# Patient Record
Sex: MALE | Race: WHITE | NOT HISPANIC OR LATINO | Employment: FULL TIME | URBAN - METROPOLITAN AREA
[De-identification: names, ages, dates, MRNs, and addresses within clinical notes are randomized per-mention and may not be internally consistent; named-entity substitution may affect disease eponyms.]

---

## 2017-02-11 ENCOUNTER — OFFICE VISIT (OUTPATIENT)
Dept: URGENT CARE | Facility: URGENT CARE | Age: 47
End: 2017-02-11
Payer: COMMERCIAL

## 2017-02-11 ENCOUNTER — RADIANT APPOINTMENT (OUTPATIENT)
Dept: GENERAL RADIOLOGY | Facility: CLINIC | Age: 47
End: 2017-02-11
Attending: FAMILY MEDICINE
Payer: COMMERCIAL

## 2017-02-11 VITALS
SYSTOLIC BLOOD PRESSURE: 107 MMHG | OXYGEN SATURATION: 98 % | TEMPERATURE: 96.5 F | DIASTOLIC BLOOD PRESSURE: 66 MMHG | HEART RATE: 82 BPM | WEIGHT: 182.4 LBS | BODY MASS INDEX: 24.18 KG/M2 | HEIGHT: 73 IN

## 2017-02-11 DIAGNOSIS — M79.641 PAIN OF RIGHT HAND: Primary | ICD-10-CM

## 2017-02-11 DIAGNOSIS — S69.90XA HAND INJURY: ICD-10-CM

## 2017-02-11 PROCEDURE — 73130 X-RAY EXAM OF HAND: CPT | Mod: RT

## 2017-02-11 PROCEDURE — 99213 OFFICE O/P EST LOW 20 MIN: CPT | Performed by: FAMILY MEDICINE

## 2017-02-11 NOTE — PROGRESS NOTES
"SUBJECTIVE:  Sascha Joshi is a 46 year old male who presents for pain in the right thumb:  Location: base of the right thumb.  Symptoms started yesterday on the palmar side of the base of the thumb after putting together some [employer] furniture.  Today, discomfort all around the base of the thumb.  No recalled injury.   No swelling or erythema.   He has taken no analgesics and tried no symptomatic cares as of yet.   No c/o numbness.  H/o fracture to the base of the thumb 2 years ago, has been to ortho for this.   Concerned for new fracture the same area today.    Left hand dominant.    ROS:  5-Point Review of Systems Negative-- Except as stated above.    EXAM:   /66 mmHg  Pulse 82  Temp(Src) 96.5  F (35.8  C) (Tympanic)  Ht 6' 1\" (1.854 m)  Wt 182 lb 6.4 oz (82.736 kg)  BMI 24.07 kg/m2  SpO2 98%  General: healthy, alert and no distress  Involved area: right thumb  Description of injury site {EXAM INJURY: no swelling, bruising, erythema noted.  No discomfort with palpation.  Skin: Intact at site  Neurovascular status: There is not compromise to the distal circulation.    Range of motion of the affected area: Full Range of Motion of the thumb/fingers, hand and wrist    X-RAY was done.  No acute fractures noted.  No dislocation noted.   Official read by radiology pending.  The clinic will call patient if result different than discussed during visit today.      ASSESSMENT/PLAN:     ICD-10-CM    1. Pain of right hand M79.641 XR Hand Right G/E 3 Views     Discussed suspected overuse injury to previously broken joint - conservative cares to start.  Anticipate improvement at 1 week, resolved by 2 weeks.  If not, further evaluation may be necessary.      Patient Instructions   Ice, aleve or ibuprofen for the next several days (take with food), avoid aggravating activity.   Recheck in 2 weeks with ortho or your primary provider if all symptoms not resolved.                "

## 2017-02-11 NOTE — MR AVS SNAPSHOT
After Visit Summary   2/11/2017    Sascha Joshi    MRN: [id number]           Patient Information     Date Of Birth          1970        Visit Information        Provider Department      2/11/2017 8:30 AM Kenisha Cosme DO [facility]        Today's Diagnoses     Hand injury    -  1       Care Instructions    Ice, aleve or ibuprofen for the next several days (take with food), avoid aggravating activity.   Recheck in 2 weeks with ortho or your primary provider if all symptoms not resolved.          Follow-ups after your visit        Who to contact     If you have questions or need follow up information about today's clinic visit or your schedule please contact [facility] directly at [phone].  Normal or non-critical lab and imaging results will be communicated to you by eReplicanthart, letter or phone within 4 business days after the clinic has received the results. If you do not hear from us within 7 days, please contact the clinic through [employer] or phone. If you have a critical or abnormal lab result, we will notify you by phone as soon as possible.  Submit refill requests through FSLogix or call your pharmacy and they will forward the refill request to us. Please allow 3 business days for your refill to be completed.          Additional Information About Your Visit        MyChart Information     FSLogix gives you secure access to your electronic health record. If you see a primary care provider, you can also send messages to your care team and make appointments. If you have questions, please call your primary care clinic.  If you do not have a primary care provider, please call [phone] and they will assist you.        Care EveryWhere ID     This is your Care EveryWhere ID. This could be used by other organizations to access your [facility] medical records  [id number]        Your Vitals Were     Pulse Temperature Height BMI (Body Mass  "Index) Pulse Oximetry       82 96.5  F (35.8  C) (Tympanic) 6' 1\" (1.854 m) 24.07 kg/m2 98%        Blood Pressure from Last 3 Encounters:   02/11/17 107/66   08/01/16 116/68   07/18/16 105/70    Weight from Last 3 Encounters:   02/11/17 182 lb 6.4 oz (82.736 kg)   08/01/16 181 lb (82.101 kg)   07/18/16 179 lb (81.194 kg)               Primary Care Provider Office Phone # Fax #    Fer Galvan [phone]       [address] [address]        Thank you!     Thank you for choosing [facility]  for your care. Our goal is always to provide you with excellent care. Hearing back from our patients is one way we can continue to improve our services. Please take a few minutes to complete the written survey that you may receive in the mail after your visit with us. Thank you!             Your Updated Medication List - Protect others around you: Learn how to safely use, store and throw away your medicines at www.disposemymeds.org.          This list is accurate as of: 2/11/17  8:51 AM.  Always use your most recent med list.                   Brand Name Dispense Instructions for use    IBUPROFEN PO            "

## 2017-02-11 NOTE — PATIENT INSTRUCTIONS
Ice, aleve or ibuprofen for the next several days (take with food), avoid aggravating activity.   Recheck in 2 weeks with ortho or your primary provider if all symptoms not resolved.

## 2017-02-11 NOTE — NURSING NOTE
"Chief Complaint   Patient presents with     Urgent Care     Pt in clinic to have eval for right hand pain due to possible injury.     Hand Injury       Initial /66 mmHg  Pulse 82  Temp(Src) 96.5  F (35.8  C) (Tympanic)  Ht 6' 1\" (1.854 m)  Wt 182 lb 6.4 oz (82.736 kg)  BMI 24.07 kg/m2  SpO2 98% Estimated body mass index is 24.07 kg/(m^2) as calculated from the following:    Height as of this encounter: 6' 1\" (1.854 m).    Weight as of this encounter: 182 lb 6.4 oz (82.736 kg).  Medication Reconciliation: complete   Makayla Angel/ MA    "

## 2018-03-17 ENCOUNTER — RADIANT APPOINTMENT (OUTPATIENT)
Dept: GENERAL RADIOLOGY | Facility: CLINIC | Age: 48
End: 2018-03-17
Attending: FAMILY MEDICINE
Payer: COMMERCIAL

## 2018-03-17 ENCOUNTER — OFFICE VISIT (OUTPATIENT)
Dept: URGENT CARE | Facility: URGENT CARE | Age: 48
End: 2018-03-17
Payer: COMMERCIAL

## 2018-03-17 VITALS
BODY MASS INDEX: 24.26 KG/M2 | WEIGHT: 189 LBS | HEIGHT: 74 IN | DIASTOLIC BLOOD PRESSURE: 76 MMHG | OXYGEN SATURATION: 99 % | TEMPERATURE: 98.4 F | HEART RATE: 81 BPM | SYSTOLIC BLOOD PRESSURE: 130 MMHG

## 2018-03-17 DIAGNOSIS — J98.8 RESPIRATORY INFECTION: ICD-10-CM

## 2018-03-17 DIAGNOSIS — R07.89 CHEST DISCOMFORT: ICD-10-CM

## 2018-03-17 DIAGNOSIS — J01.90 ACUTE SINUSITIS WITH SYMPTOMS GREATER THAN 10 DAYS: Primary | ICD-10-CM

## 2018-03-17 PROCEDURE — 99214 OFFICE O/P EST MOD 30 MIN: CPT | Performed by: FAMILY MEDICINE

## 2018-03-17 PROCEDURE — 71046 X-RAY EXAM CHEST 2 VIEWS: CPT

## 2018-03-17 RX ORDER — AZITHROMYCIN 250 MG/1
TABLET, FILM COATED ORAL
Qty: 6 TABLET | Refills: 0 | Status: SHIPPED | OUTPATIENT
Start: 2018-03-17 | End: 2018-03-23

## 2018-03-17 RX ORDER — FLUTICASONE PROPIONATE 50 MCG
1-2 SPRAY, SUSPENSION (ML) NASAL DAILY
Qty: 1 BOTTLE | Refills: 0 | Status: SHIPPED | OUTPATIENT
Start: 2018-03-17 | End: 2019-10-24

## 2018-03-17 NOTE — MR AVS SNAPSHOT
After Visit Summary   3/17/2018    Sascha Joshi    MRN: [id number]           Patient Information     Date Of Birth          1970        Visit Information        Provider Department      3/17/2018 7:10 PM Kenisha Cosme DO [facility]        Today's Diagnoses     Acute sinusitis with symptoms greater than 10 days    -  1    Respiratory infection        Chest discomfort          Care Instructions    Start the antibiotic tonight.   Consider nasal saline mist for comfort with the nasal congestion.  Start the nasal steroid spray tonight as well.      Aleve for discomfort every 12 hours (take with food) for the next 2-3 days, then if/as needed.    If symptoms not resolving over the next week, or if any worsening symptoms develop, recheck with your primary provider.          Follow-ups after your visit        Who to contact     If you have questions or need follow up information about today's clinic visit or your schedule please contact [facility] directly at [phone].  Normal or non-critical lab and imaging results will be communicated to you by Shelfiehart, letter or phone within 4 business days after the clinic has received the results. If you do not hear from us within 7 days, please contact the clinic through Glancet or phone. If you have a critical or abnormal lab result, we will notify you by phone as soon as possible.  Submit refill requests through Vint or call your pharmacy and they will forward the refill request to us. Please allow 3 business days for your refill to be completed.          Additional Information About Your Visit        MyChart Information     Vint gives you secure access to your electronic health record. If you see a primary care provider, you can also send messages to your care team and make appointments. If you have questions, please call your primary care clinic.  If you do not have a primary care provider,  "please call [phone] and they will assist you.        Care EveryWhere ID     This is your Care EveryWhere ID. This could be used by other organizations to access your [facility] medical records  [id number]        Your Vitals Were     Pulse Temperature Height Pulse Oximetry BMI (Body Mass Index)       81 98.4  F (36.9  C) (Tympanic) 6' 2\" (1.88 m) 99% 24.27 kg/m2        Blood Pressure from Last 3 Encounters:   03/17/18 130/76   02/11/17 107/66   08/01/16 116/68    Weight from Last 3 Encounters:   03/17/18 189 lb (85.7 kg)   02/11/17 182 lb 6.4 oz (82.7 kg)   08/01/16 181 lb (82.1 kg)                 Today's Medication Changes          These changes are accurate as of 3/17/18  8:24 PM.  If you have any questions, ask your nurse or doctor.               Start taking these medicines.        Dose/Directions    azithromycin 250 MG tablet   Commonly known as:  ZITHROMAX   Used for:  Acute sinusitis with symptoms greater than 10 days   Started by:  Kenisha Cosme,         Two tablets first day, then one tablet daily for four days.   Quantity:  6 tablet   Refills:  0       fluticasone 50 MCG/ACT spray   Commonly known as:  FLONASE   Used for:  Acute sinusitis with symptoms greater than 10 days   Started by:  Kenisha Cosme,         Dose:  1-2 spray   Spray 1-2 sprays into both nostrils daily   Quantity:  1 Bottle   Refills:  0            Where to get your medicines      These medications were sent to [facility]/pharmacy #[id number] - [place], [place] - [address] AT [facility]  [address] [place] [address]    Hours:  24-hours Phone:  [phone]     azithromycin 250 MG tablet    fluticasone 50 MCG/ACT spray                Primary Care Provider Office Phone # Fax #    Fer Galvan [phone] [address] [facility] [address]        Equal Access to Services     HOA STOCK AH: Rogers Monterroso, dain luafsaneh, qaybta kaalmaml guerrain " giselaandrewtessie seniortaisha rohanadele larenetessie ah. So [facility] [phone].    ATENCIÓN: Si mariaelenala david, tiene a mcintyre disposición servicios gratuitos de asistencia lingüística. Aleksandra al [phone].    We comply with applicable federal civil rights laws and [place] laws. We do not discriminate on the basis of race, color, national origin, age, disability, sex, sexual orientation, or gender identity.            Thank you!     Thank you for choosing [facility]RG[facility]  for your care. Our goal is always to provide you with excellent care. Hearing back from our patients is one way we can continue to improve our services. Please take a few minutes to complete the written survey that you may receive in the mail after your visit with us. Thank you!             Your Updated Medication List - Protect others around you: Learn how to safely use, store and throw away your medicines at www.disposemymeds.org.          This list is accurate as of 3/17/18  8:24 PM.  Always use your most recent med list.                   Brand Name Dispense Instructions for use Diagnosis    azithromycin 250 MG tablet    ZITHROMAX    6 tablet    Two tablets first day, then one tablet daily for four days.    Acute sinusitis with symptoms greater than 10 days       fluticasone 50 MCG/ACT spray    FLONASE    1 Bottle    Spray 1-2 sprays into both nostrils daily    Acute sinusitis with symptoms greater than 10 days       IBUPROFEN PO       Injury of right hand, initial encounter

## 2018-03-18 NOTE — NURSING NOTE
"Chief Complaint   Patient presents with     Urgent Care     Pt in clinic to have eval for head congestion, chest discomfort. and nasal congestion.     Respiratory Problems       Initial /76  Pulse 81  Temp 98.4  F (36.9  C) (Tympanic)  Ht 6' 2\" (1.88 m)  Wt 189 lb (85.7 kg)  SpO2 99%  BMI 24.27 kg/m2 Estimated body mass index is 24.27 kg/(m^2) as calculated from the following:    Height as of this encounter: 6' 2\" (1.88 m).    Weight as of this encounter: 189 lb (85.7 kg).  Medication Reconciliation: complete   Makayla Angel/ MA    "

## 2018-03-18 NOTE — PROGRESS NOTES
"SUBJECTIVE:   Sascha Joshi is a 47 year old male presenting with a chief complaint of Upper Respiratory/ENT symptoms:  Symptoms started 2 weeks ago with nasal sinus congestion without cough.  Runny initially, now stuffy and congested.  Occasional blood streaking on tissue with blowing or clearing out nose.  No coughing or sneezing.   Left ear pressure has developed.   Over the past day or so he has noticed intermittent chest discomfort that was on the left axillary area at one point, then went to the right axillary region. Today its noticed on the right side, near the axilla.  Comes and goes.  A little more noticeable with deep breath.   No fevers during the illness.  Not feeling any shortness of breath.   Did have the sensation of noticing his heart beat over a week ago, not too hard or too fast, just noticing the beat when normally he doesn't.  No alcohol, a little caffeine, no h/o asthma.  Non smoker.     ROS:  5-Point Review of Systems Negative-- Except as stated above.    OBJECTIVE  /76  Pulse 81  Temp 98.4  F (36.9  C) (Tympanic)  Ht 6' 2\" (1.88 m)  Wt 189 lb (85.7 kg)  SpO2 99%  BMI 24.27 kg/m2  GENERAL:  Awake, alert and interactive. No acute distress.  HEENT:   NC/AT, EOMI, clear conjunctiva.  Nose congested.  Oropharynx moist and clear.  TM's dull with effusions BL, and EAC's benign.  NECK: supple and free of adenopathy  CHEST:  Lungs are clear, no rhonchi, wheezing or rales. Normal symmetric air entry throughout both lung fields.    HEART:  S1 and S2 normal, no murmurs, clicks, gallops or rubs. Regular rate and rhythm.    CXR - no infiltrate or effusion.  Official read by radiology pending.  The clinic will call patient if result different than discussed during visit today.      ASSESSMENT/PLAN    ICD-10-CM    1. Acute sinusitis with symptoms greater than 10 days J01.90 fluticasone (FLONASE) 50 MCG/ACT spray     azithromycin (ZITHROMAX) 250 MG tablet   2. Respiratory infection J98.8 XR " Chest 2 Views   3. Chest discomfort R07.89 XR Chest 2 Views   nsaids for the pleuritic discomfort.  We discussed the expected course and symptomatic cares in detail, including return to care if symptoms not improving as expected, do not resolve completely, or if any new or worsening symptoms develop.  Patient Instructions   Start the antibiotic tonight.   Consider nasal saline mist for comfort with the nasal congestion.  Start the nasal steroid spray tonight as well.      Aleve for discomfort every 12 hours (take with food) for the next 2-3 days, then if/as needed.    If symptoms not resolving over the next week, or if any worsening symptoms develop, recheck with your primary provider.

## 2018-03-18 NOTE — PATIENT INSTRUCTIONS
Start the antibiotic tonight.   Consider nasal saline mist for comfort with the nasal congestion.  Start the nasal steroid spray tonight as well.      Aleve for discomfort every 12 hours (take with food) for the next 2-3 days, then if/as needed.    If symptoms not resolving over the next week, or if any worsening symptoms develop, recheck with your primary provider.

## 2018-03-23 ENCOUNTER — OFFICE VISIT (OUTPATIENT)
Dept: FAMILY MEDICINE | Facility: CLINIC | Age: 48
End: 2018-03-23
Payer: COMMERCIAL

## 2018-03-23 VITALS
OXYGEN SATURATION: 100 % | HEART RATE: 75 BPM | BODY MASS INDEX: 24.45 KG/M2 | SYSTOLIC BLOOD PRESSURE: 124 MMHG | TEMPERATURE: 97.2 F | RESPIRATION RATE: 16 BRPM | WEIGHT: 190.4 LBS | DIASTOLIC BLOOD PRESSURE: 75 MMHG

## 2018-03-23 DIAGNOSIS — R07.89 SENSATION OF CHEST PRESSURE: Primary | ICD-10-CM

## 2018-03-23 PROCEDURE — 99213 OFFICE O/P EST LOW 20 MIN: CPT | Performed by: NURSE PRACTITIONER

## 2018-03-23 NOTE — PROGRESS NOTES
SUBJECTIVE:   Sascha Joshi is a 47 year old male who presents to clinic today for the following health issues:    ED/UC Followup:    Facility:  [facility]  Date of visit: 03/17/2018  Reason for visit: Acute sinusitis  Current Status: Doesn't feel much better- still having some chest discomfort, more pressure in his neck area.      Sinus symptoms have resolved.  Still having chest pressure and feels like a pressure in his neck.    Was told it was pleuritic chest pressure.    No longer coughing or congested.   No reflux or GERD  No fevers.    Eating normally.    Denies chest pain, KATZ, SOB, dizziness or lightheadedness.  No pain radiating to left arm or jaw.  No reflux.      Problem list and histories reviewed & adjusted, as indicated.  Additional history: as documented    Reviewed and updated as needed this visit by clinical staff  Tobacco  Allergies  Meds  Med Hx  Surg Hx  Fam Hx  Soc Hx      Reviewed and updated as needed this visit by Provider  Tobacco  Med Hx  Surg Hx  Fam Hx  Soc Hx        ROS:  Constitutional, HEENT, cardiovascular, pulmonary, gi and gu systems are negative, except as otherwise noted.    OBJECTIVE:     /75 (BP Location: Right arm, Patient Position: Chair, Cuff Size: Adult Regular)  Pulse 75  Temp 97.2  F (36.2  C) (Tympanic)  Resp 16  Wt 190 lb 6.4 oz (86.4 kg)  SpO2 100%  BMI 24.45 kg/m2  Body mass index is 24.45 kg/(m^2).  GENERAL: healthy, alert and no distress  EYES: Eyes grossly normal to inspection, PERRL and conjunctivae and sclerae normal  HENT: ear canals and TM's normal, nose and mouth without ulcers or lesions  NECK: no adenopathy, no asymmetry, masses, or scars and thyroid normal to palpation  RESP: lungs clear to auscultation - no rales, rhonchi or wheezes  CV: regular rate and rhythm, normal S1 S2, no S3 or S4, no murmur, click or rub, no peripheral edema and peripheral pulses strong  ABDOMEN: soft, nontender, no hepatosplenomegaly, no  masses and bowel sounds normal  MS: no gross musculoskeletal defects noted, no edema  SKIN: no suspicious lesions or rashes  PSYCH: mentation appears normal, affect normal/bright      ASSESSMENT/PLAN:       ICD-10-CM    1. Sensation of chest pressure R07.89    discussed normal exam and VSS.  Discussed if it was pleurisy can take some time to resolve.    Sinus symptoms have since resolved.  Will continue to monitor pressure and f/u if persists or worsens.      See Patient Instructions    SOURAV Clifton CNP  [facility]

## 2018-03-23 NOTE — MR AVS SNAPSHOT
After Visit Summary   3/23/2018    Sascha Joshi    MRN: [id number]           Patient Information     Date Of Birth          1970        Visit Information        Provider Department      3/23/2018 11:00 AM Kyra Whitfield APRN CNP [facility]        Today's Diagnoses     Sensation of chest pressure    -  1       Follow-ups after your visit        Who to contact     If you have questions or need follow up information about today's clinic visit or your schedule please contact [facility] directly at [phone].  Normal or non-critical lab and imaging results will be communicated to you by 360Guanxihart, letter or phone within 4 business days after the clinic has received the results. If you do not hear from us within 7 days, please contact the clinic through Datezrt or phone. If you have a critical or abnormal lab result, we will notify you by phone as soon as possible.  Submit refill requests through CelluComp or call your pharmacy and they will forward the refill request to us. Please allow 3 business days for your refill to be completed.          Additional Information About Your Visit        MyChart Information     CelluComp gives you secure access to your electronic health record. If you see a primary care provider, you can also send messages to your care team and make appointments. If you have questions, please call your primary care clinic.  If you do not have a primary care provider, please call [phone] and they will assist you.        Care EveryWhere ID     This is your Care EveryWhere ID. This could be used by other organizations to access your [facility] medical records  [id number]        Your Vitals Were     Pulse Temperature Respirations Pulse Oximetry BMI (Body Mass Index)       75 97.2  F (36.2  C) (Tympanic) 16 100% 24.45 kg/m2        Blood Pressure from Last 3 Encounters:   03/23/18 124/75   03/17/18 130/76   02/11/17 107/66    Weight from  Last 3 Encounters:   03/23/18 190 lb 6.4 oz (86.4 kg)   03/17/18 189 lb (85.7 kg)   02/11/17 182 lb 6.4 oz (82.7 kg)              Today, you had the following     No orders found for display       Primary Care Provider Office Phone # Fax #    Fer Bao Galvan [phone]       [address]  [facility] [address]        Equal Access to Services     HOA STOCK : Hadii aad ku hadasho Soomaali, waaxda luqadaha, qaybta kaalmada adeegyada, waxay idiin hayaan adeeg kharash la'aan . So [facility] [phone].    ATENCIÓN: Si habla español, tiene a mcintyre disposición servicios gratuitos de asistencia lingüística. [facility] [phone].    We comply with applicable federal civil rights laws and [place] laws. We do not discriminate on the basis of race, color, national origin, age, disability, sex, sexual orientation, or gender identity.            Thank you!     Thank you for choosing [facility]  for your care. Our goal is always to provide you with excellent care. Hearing back from our patients is one way we can continue to improve our services. Please take a few minutes to complete the written survey that you may receive in the mail after your visit with us. Thank you!             Your Updated Medication List - Protect others around you: Learn how to safely use, store and throw away your medicines at www.disposemymeds.org.          This list is accurate as of 3/23/18  7:11 PM.  Always use your most recent med list.                   Brand Name Dispense Instructions for use Diagnosis    fluticasone 50 MCG/ACT spray    FLONASE    1 Bottle    Spray 1-2 sprays into both nostrils daily    Acute sinusitis with symptoms greater than 10 days       IBUPROFEN PO       Injury of right hand, initial encounter

## 2019-08-14 ENCOUNTER — OFFICE VISIT (OUTPATIENT)
Dept: URGENT CARE | Facility: URGENT CARE | Age: 49
End: 2019-08-14
Payer: COMMERCIAL

## 2019-08-14 VITALS
TEMPERATURE: 98.3 F | HEART RATE: 80 BPM | OXYGEN SATURATION: 99 % | WEIGHT: 185 LBS | DIASTOLIC BLOOD PRESSURE: 70 MMHG | SYSTOLIC BLOOD PRESSURE: 114 MMHG | BODY MASS INDEX: 23.75 KG/M2

## 2019-08-14 DIAGNOSIS — L74.0 HEAT RASH: Primary | ICD-10-CM

## 2019-08-14 DIAGNOSIS — R21 RASH AND NONSPECIFIC SKIN ERUPTION: ICD-10-CM

## 2019-08-14 PROCEDURE — 99213 OFFICE O/P EST LOW 20 MIN: CPT | Performed by: INTERNAL MEDICINE

## 2019-08-14 NOTE — PROGRESS NOTES
SUBJECTIVE:   Sascha Joshi is a 49 year old male presenting with a chief complaint of   Chief Complaint   Patient presents with     Urgent Care     Pt in clinic to have eval for body rash following vacation to [facility].     Derm Problem       He is an established patient of [facility].    Rash    Onset of rash was 1 week(s) ago.   1/2 way through trip in [place]    Red dots on hands -   treatment HC  Then antibiotics ointment  Theses have faded.  Then on back of knees forearms, then forearm past few days.    Current and Associated symptoms: slight itching and red bumps  Previous history of a similar rash? No  Recent exposure history: [place]  Swimming, new shampoos & soaps from hotel  Back 5 days ago    Associated symptoms include: nothing.    Review of Systems    Past Medical History:   Diagnosis Date     NO ACTIVE PROBLEMS      Family History   Problem Relation Age of Onset     C.A.D. Mother         heart attack age 63     Coronary Artery Disease Mother      Hypertension Mother      Asthma Sister      Current Outpatient Medications   Medication Sig Dispense Refill     fluticasone (FLONASE) 50 MCG/ACT spray Spray 1-2 sprays into both nostrils daily (Patient not taking: Reported on 3/23/2018) 1 Bottle 0     IBUPROFEN PO        Social History     Tobacco Use     Smoking status: Never Smoker     Smokeless tobacco: Never Used   Substance Use Topics     Alcohol use: Yes     Alcohol/week: 0.0 oz     Comment: 2-3 drinks a week       OBJECTIVE  /70   Pulse 80   Temp 98.3  F (36.8  C) (Oral)   Wt 83.9 kg (185 lb)   SpO2 99%   BMI 23.75 kg/m      Physical Exam   Constitutional: He appears well-developed and well-nourished.   Skin:   Examination of hands reveal no evidence of rash that I can tell.  Patient states it has improved    Examination of forearms reveal slight subtle red papules    examination of bilateral popliteal fossa reveals thickened moist skin with some surrounding papules    Examination of scalp reveals  a scab and no obvious lesions noted except for chronic skin changes from sun damage    Photos taken of each area examined below       Vitals reviewed.                        Labs:  No results found for this or any previous visit (from the past 24 hour(s)).        ASSESSMENT:      ICD-10-CM    1. Heat rash L74.0    2. Rash and nonspecific skin eruption R21         Medical Decision Making:  At this point I feel as if his rash has been improving and is minor.  It does not appear contagious.  Discussed it could be related to heat rash and humidity with his recent trip to [place]    The rash on the back of the knee also appears slightly eczematous.  Discussed this area could benefit from continued hydrocortisone cream  PLAN:  CC Dr Galvan    Patient Instructions   Rash not contagious    Could be heat rash   Cool baths  Air dry.  Try HC on back of knees as most irritated looking.        Recheck 2 weeks with Dr Galvan if not better.

## 2019-08-14 NOTE — PATIENT INSTRUCTIONS
Rash not contagious    Could be heat rash   Cool baths  Air dry.  Try HC on back of knees as most irritated looking.        Recheck 2 weeks with Dr Galvan if not better.

## 2019-10-04 ENCOUNTER — HEALTH MAINTENANCE LETTER (OUTPATIENT)
Age: 49
End: 2019-10-04

## 2019-10-22 ASSESSMENT — ENCOUNTER SYMPTOMS
HEADACHES: 0
ABDOMINAL PAIN: 0
HEMATURIA: 0
WEAKNESS: 0
COUGH: 0
DYSURIA: 0
ARTHRALGIAS: 0
SORE THROAT: 0
DIARRHEA: 0
FEVER: 0
CHILLS: 0
PARESTHESIAS: 0
NAUSEA: 0
DIZZINESS: 0
MYALGIAS: 0
EYE PAIN: 0
SHORTNESS OF BREATH: 0
HEMATOCHEZIA: 0
HEARTBURN: 0
FREQUENCY: 0
JOINT SWELLING: 0
NERVOUS/ANXIOUS: 0
CONSTIPATION: 0
PALPITATIONS: 0

## 2019-10-24 ENCOUNTER — OFFICE VISIT (OUTPATIENT)
Dept: FAMILY MEDICINE | Facility: CLINIC | Age: 49
End: 2019-10-24
Payer: COMMERCIAL

## 2019-10-24 VITALS
RESPIRATION RATE: 16 BRPM | BODY MASS INDEX: 25.18 KG/M2 | TEMPERATURE: 96.5 F | DIASTOLIC BLOOD PRESSURE: 68 MMHG | HEART RATE: 68 BPM | WEIGHT: 190 LBS | HEIGHT: 73 IN | SYSTOLIC BLOOD PRESSURE: 112 MMHG

## 2019-10-24 DIAGNOSIS — Z23 NEED FOR PROPHYLACTIC VACCINATION AND INOCULATION AGAINST INFLUENZA: ICD-10-CM

## 2019-10-24 DIAGNOSIS — Z00.00 ROUTINE GENERAL MEDICAL EXAMINATION AT A HEALTH CARE FACILITY: ICD-10-CM

## 2019-10-24 PROCEDURE — 99396 PREV VISIT EST AGE 40-64: CPT | Mod: 25 | Performed by: FAMILY MEDICINE

## 2019-10-24 PROCEDURE — 90686 IIV4 VACC NO PRSV 0.5 ML IM: CPT | Performed by: FAMILY MEDICINE

## 2019-10-24 PROCEDURE — 90471 IMMUNIZATION ADMIN: CPT | Performed by: FAMILY MEDICINE

## 2019-10-24 ASSESSMENT — ENCOUNTER SYMPTOMS
DIARRHEA: 0
HEARTBURN: 0
CHILLS: 0
WEAKNESS: 0
EYE PAIN: 0
DIZZINESS: 0
PARESTHESIAS: 0
HEMATOCHEZIA: 0
MYALGIAS: 0
DYSURIA: 0
FREQUENCY: 0
NAUSEA: 0
COUGH: 0
ABDOMINAL PAIN: 0
NERVOUS/ANXIOUS: 0
SORE THROAT: 0
JOINT SWELLING: 0
FEVER: 0
CONSTIPATION: 0
HEMATURIA: 0
ARTHRALGIAS: 0
SHORTNESS OF BREATH: 0
PALPITATIONS: 0
HEADACHES: 0

## 2019-10-24 ASSESSMENT — MIFFLIN-ST. JEOR: SCORE: 1785.71

## 2019-10-24 NOTE — PROGRESS NOTES
SUBJECTIVE:   CC: Sascha Joshi is an 49 year old male who presents for preventative health visit.     Healthy Habits:     Getting at least 3 servings of Calcium per day:  Yes    Bi-annual eye exam:  Yes    Dental care twice a year:  Yes    Sleep apnea or symptoms of sleep apnea:  None    Diet:  Regular (no restrictions)    Frequency of exercise:  1 day/week    Duration of exercise:  Less than 15 minutes    Taking medications regularly:  Not Applicable    Medication side effects:  Not applicable    PHQ-2 Total Score: 0    Additional concerns today:  No    Current Chronic Medical Conditions  None    Family History  Mom- CAD, valve replacement  in 70s, HTN, lipids  Dad- healthy  Older brother with glaucoma  One sister- asthma  Other brother- healthy    Social History  -- 13 and 15 year old daughters-- 7th (plays piano and viola) and 10th grade (enjoys [employer] arts) at [employer].  Does office work.  Nonsmoker.  No formal exercise.      Today's PHQ-2 Score:   PHQ-2 (  [employer]) 10/22/2019   Q1: Little interest or pleasure in doing things 0   Q2: Feeling down, depressed or hopeless 0   PHQ-2 Score 0   Q1: Little interest or pleasure in doing things Not at all   Q2: Feeling down, depressed or hopeless Not at all   PHQ-2 Score 0       Abuse: Current or Past(Physical, Sexual or Emotional)- No  Do you feel safe in your environment? Yes    Social History     Tobacco Use     Smoking status: Never Smoker     Smokeless tobacco: Never Used   Substance Use Topics     Alcohol use: Yes     Alcohol/week: 0.0 standard drinks     Comment: 2-3 drinks a week     If you drink alcohol do you typically have >3 drinks per day or >7 drinks per week? No    Alcohol Use 10/24/2019   Prescreen: >3 drinks/day or >7 drinks/week? -   Prescreen: >3 drinks/day or >7 drinks/week? No       Last PSA: No results found for: PSA    Reviewed orders with patient. Reviewed health maintenance and updated orders accordingly - Yes  BP  Readings from Last 3 Encounters:   10/24/19 112/68   08/14/19 114/70   03/23/18 124/75    Wt Readings from Last 3 Encounters:   10/24/19 86.2 kg (190 lb)   08/14/19 83.9 kg (185 lb)   03/23/18 86.4 kg (190 lb 6.4 oz)                  Patient Active Problem List   Diagnosis     NO ACTIVE PROBLEMS     Dermatofibroma of lower extremity     Cervical radiculopathy     Past Surgical History:   Procedure Laterality Date     HERNIA REPAIR, INGUINAL RT/LT  2005       Social History     Tobacco Use     Smoking status: Never Smoker     Smokeless tobacco: Never Used   Substance Use Topics     Alcohol use: Yes     Alcohol/week: 0.0 standard drinks     Comment: 2-3 drinks a week     Family History   Problem Relation Age of Onset     C.A.D. Mother         heart attack age 63     Coronary Artery Disease Mother      Hypertension Mother      Asthma Sister      Cerebrovascular Disease Paternal Grandmother      Asthma Sister          No current outpatient medications on file.     Allergies   Allergen Reactions     Nkda [No Known Drug Allergies]      Recent Labs   Lab Test 08/06/12  0924   LDL 79   HDL 47   TRIG 51        Reviewed and updated as needed this visit by clinical staff  Tobacco  Allergies  Meds  Med Hx  Surg Hx  Fam Hx  Soc Hx        Reviewed and updated as needed this visit by Provider            Review of Systems   Constitutional: Negative for chills and fever.   HENT: Negative for congestion, ear pain, hearing loss and sore throat.    Eyes: Negative for pain and visual disturbance.   Respiratory: Negative for cough and shortness of breath.    Cardiovascular: Negative for chest pain, palpitations and peripheral edema.   Gastrointestinal: Negative for abdominal pain, constipation, diarrhea, heartburn, hematochezia and nausea.   Genitourinary: Negative for discharge, dysuria, frequency, genital sores, hematuria, impotence and urgency.   Musculoskeletal: Negative for arthralgias, joint swelling and myalgias.   Skin:  "Negative for rash.   Neurological: Negative for dizziness, weakness, headaches and paresthesias.   Psychiatric/Behavioral: Negative for mood changes. The patient is not nervous/anxious.        OBJECTIVE:   /68   Pulse 68   Temp 96.5  F (35.8  C) (Tympanic)   Resp 16   Ht 1.854 m (6' 1\")   Wt 86.2 kg (190 lb)   BMI 25.07 kg/m      Physical Exam  GENERAL: healthy, alert and no distress  EYES: Eyes grossly normal to inspection, PERRL and conjunctivae and sclerae normal  HENT: ear canals and TM's normal, nose and mouth without ulcers or lesions  NECK: no adenopathy, no asymmetry, masses, or scars and thyroid normal to palpation  RESP: lungs clear to auscultation - no rales, rhonchi or wheezes  CV: regular rate and rhythm, normal S1 S2, no S3 or S4, no murmur, click or rub, no peripheral edema and peripheral pulses strong  ABDOMEN: soft, nontender, no hepatosplenomegaly, no masses and bowel sounds normal  MS: no gross musculoskeletal defects noted, no edema  SKIN: no suspicious lesions or rashes  NEURO: Normal strength and tone, mentation intact and speech normal  PSYCH: mentation appears normal, affect normal/bright      ASSESSMENT/PLAN:   1. Routine general medical examination at a health care facility    - CBC with platelets differential; Future  - Comprehensive metabolic panel; Future  - Lipid panel reflex to direct LDL Fasting; Future    Will return for fasting labs.  Continue improved diet and formal exercise.    2. Need for prophylactic vaccination and inoculation against influenza    - INFLUENZA VACCINE IM > 6 MONTHS VALENT IIV4 [09745]  - Vaccine Administration, Initial [95084]    COUNSELING:   Reviewed preventive health counseling, as reflected in patient instructions       Regular exercise       Healthy diet/nutrition    Estimated body mass index is 25.07 kg/m  as calculated from the following:    Height as of this encounter: 1.854 m (6' 1\").    Weight as of this encounter: 86.2 kg (190 lb).   "        reports that he has never smoked. He has never used smokeless tobacco.      Counseling Resources:  ATP IV Guidelines  Pooled Cohorts Equation Calculator  FRAX Risk Assessment  ICSI Preventive Guidelines  Dietary Guidelines for Americans, 2010  USDA's MyPlate  ASA Prophylaxis  Lung CA Screening    Georgia Cooper MD  [facility]

## 2019-10-31 DIAGNOSIS — Z00.00 ROUTINE GENERAL MEDICAL EXAMINATION AT A HEALTH CARE FACILITY: ICD-10-CM

## 2019-10-31 LAB
ALBUMIN SERPL-MCNC: 3.9 G/DL (ref 3.4–5)
ALP SERPL-CCNC: 76 U/L (ref 40–150)
ALT SERPL W P-5'-P-CCNC: 24 U/L (ref 0–70)
ANION GAP SERPL CALCULATED.3IONS-SCNC: 7 MMOL/L (ref 3–14)
AST SERPL W P-5'-P-CCNC: 18 U/L (ref 0–45)
BASOPHILS # BLD AUTO: 0 10E9/L (ref 0–0.2)
BASOPHILS NFR BLD AUTO: 0.7 %
BILIRUB SERPL-MCNC: 1.1 MG/DL (ref 0.2–1.3)
BUN SERPL-MCNC: 15 MG/DL (ref 7–30)
CALCIUM SERPL-MCNC: 8.7 MG/DL (ref 8.5–10.1)
CHLORIDE SERPL-SCNC: 106 MMOL/L (ref 94–109)
CHOLEST SERPL-MCNC: 153 MG/DL
CO2 SERPL-SCNC: 26 MMOL/L (ref 20–32)
CREAT SERPL-MCNC: 0.92 MG/DL (ref 0.66–1.25)
DIFFERENTIAL METHOD BLD: NORMAL
EOSINOPHIL # BLD AUTO: 0.1 10E9/L (ref 0–0.7)
EOSINOPHIL NFR BLD AUTO: 1.9 %
ERYTHROCYTE [DISTWIDTH] IN BLOOD BY AUTOMATED COUNT: 12.5 % (ref 10–15)
GFR SERPL CREATININE-BSD FRML MDRD: >90 ML/MIN/{1.73_M2}
GLUCOSE SERPL-MCNC: 88 MG/DL (ref 70–99)
HCT VFR BLD AUTO: 42.9 % (ref 40–53)
HDLC SERPL-MCNC: 46 MG/DL
HGB BLD-MCNC: 14.5 G/DL (ref 13.3–17.7)
LDLC SERPL CALC-MCNC: 96 MG/DL
LYMPHOCYTES # BLD AUTO: 1.5 10E9/L (ref 0.8–5.3)
LYMPHOCYTES NFR BLD AUTO: 25.8 %
MCH RBC QN AUTO: 31.6 PG (ref 26.5–33)
MCHC RBC AUTO-ENTMCNC: 33.8 G/DL (ref 31.5–36.5)
MCV RBC AUTO: 94 FL (ref 78–100)
MONOCYTES # BLD AUTO: 0.7 10E9/L (ref 0–1.3)
MONOCYTES NFR BLD AUTO: 11.8 %
NEUTROPHILS # BLD AUTO: 3.5 10E9/L (ref 1.6–8.3)
NEUTROPHILS NFR BLD AUTO: 59.8 %
NONHDLC SERPL-MCNC: 107 MG/DL
PLATELET # BLD AUTO: 180 10E9/L (ref 150–450)
POTASSIUM SERPL-SCNC: 4.1 MMOL/L (ref 3.4–5.3)
PROT SERPL-MCNC: 7.1 G/DL (ref 6.8–8.8)
RBC # BLD AUTO: 4.59 10E12/L (ref 4.4–5.9)
SODIUM SERPL-SCNC: 139 MMOL/L (ref 133–144)
TRIGL SERPL-MCNC: 56 MG/DL
WBC # BLD AUTO: 5.9 10E9/L (ref 4–11)

## 2019-10-31 PROCEDURE — 80053 COMPREHEN METABOLIC PANEL: CPT | Performed by: FAMILY MEDICINE

## 2019-10-31 PROCEDURE — 36415 COLL VENOUS BLD VENIPUNCTURE: CPT | Performed by: FAMILY MEDICINE

## 2019-10-31 PROCEDURE — 85025 COMPLETE CBC W/AUTO DIFF WBC: CPT | Performed by: FAMILY MEDICINE

## 2019-10-31 PROCEDURE — 80061 LIPID PANEL: CPT | Performed by: FAMILY MEDICINE

## 2020-11-08 ENCOUNTER — HEALTH MAINTENANCE LETTER (OUTPATIENT)
Age: 50
End: 2020-11-08

## 2020-11-10 ASSESSMENT — ENCOUNTER SYMPTOMS
SORE THROAT: 0
MYALGIAS: 0
HEMATURIA: 0
DIARRHEA: 0
JOINT SWELLING: 0
HEARTBURN: 0
CHILLS: 0
HEADACHES: 0
SHORTNESS OF BREATH: 0
DIZZINESS: 0
PALPITATIONS: 0
FREQUENCY: 0
DYSURIA: 0
NAUSEA: 0
ARTHRALGIAS: 0
WEAKNESS: 0
CONSTIPATION: 0
EYE PAIN: 0
COUGH: 0
NERVOUS/ANXIOUS: 0
ABDOMINAL PAIN: 0
FEVER: 0
HEMATOCHEZIA: 0
PARESTHESIAS: 0

## 2020-11-11 ENCOUNTER — OFFICE VISIT (OUTPATIENT)
Dept: FAMILY MEDICINE | Facility: CLINIC | Age: 50
End: 2020-11-11
Payer: COMMERCIAL

## 2020-11-11 VITALS
SYSTOLIC BLOOD PRESSURE: 110 MMHG | HEIGHT: 72 IN | RESPIRATION RATE: 16 BRPM | DIASTOLIC BLOOD PRESSURE: 68 MMHG | HEART RATE: 80 BPM | BODY MASS INDEX: 24.87 KG/M2 | TEMPERATURE: 97.9 F | WEIGHT: 183.6 LBS

## 2020-11-11 DIAGNOSIS — Z00.00 ROUTINE GENERAL MEDICAL EXAMINATION AT A HEALTH CARE FACILITY: Primary | ICD-10-CM

## 2020-11-11 DIAGNOSIS — Z13.220 SCREENING FOR HYPERLIPIDEMIA: ICD-10-CM

## 2020-11-11 DIAGNOSIS — Z12.5 SCREENING FOR PROSTATE CANCER: ICD-10-CM

## 2020-11-11 DIAGNOSIS — Z13.1 SCREENING FOR DIABETES MELLITUS: ICD-10-CM

## 2020-11-11 LAB
CHOLEST SERPL-MCNC: 161 MG/DL
GLUCOSE SERPL-MCNC: 82 MG/DL (ref 70–99)
HDLC SERPL-MCNC: 40 MG/DL
LDLC SERPL CALC-MCNC: 110 MG/DL
NONHDLC SERPL-MCNC: 121 MG/DL
PSA SERPL-ACNC: 1.41 UG/L (ref 0–4)
TRIGL SERPL-MCNC: 55 MG/DL

## 2020-11-11 PROCEDURE — 36415 COLL VENOUS BLD VENIPUNCTURE: CPT | Performed by: NURSE PRACTITIONER

## 2020-11-11 PROCEDURE — 90686 IIV4 VACC NO PRSV 0.5 ML IM: CPT | Performed by: NURSE PRACTITIONER

## 2020-11-11 PROCEDURE — G0103 PSA SCREENING: HCPCS | Performed by: NURSE PRACTITIONER

## 2020-11-11 PROCEDURE — 80061 LIPID PANEL: CPT | Performed by: NURSE PRACTITIONER

## 2020-11-11 PROCEDURE — 90471 IMMUNIZATION ADMIN: CPT | Performed by: NURSE PRACTITIONER

## 2020-11-11 PROCEDURE — 99396 PREV VISIT EST AGE 40-64: CPT | Mod: 25 | Performed by: NURSE PRACTITIONER

## 2020-11-11 PROCEDURE — 82947 ASSAY GLUCOSE BLOOD QUANT: CPT | Performed by: NURSE PRACTITIONER

## 2020-11-11 ASSESSMENT — ENCOUNTER SYMPTOMS
DIZZINESS: 0
FREQUENCY: 0
FEVER: 0
PALPITATIONS: 0
COUGH: 0
DIARRHEA: 0
WEAKNESS: 0
SHORTNESS OF BREATH: 0
EYE PAIN: 0
HEADACHES: 0
NERVOUS/ANXIOUS: 0
CHILLS: 0
PARESTHESIAS: 0
CONSTIPATION: 0
SORE THROAT: 0
DYSURIA: 0
HEMATURIA: 0
HEMATOCHEZIA: 0
NAUSEA: 0
HEARTBURN: 0
MYALGIAS: 0
JOINT SWELLING: 0
ABDOMINAL PAIN: 0
ARTHRALGIAS: 0

## 2020-11-11 ASSESSMENT — MIFFLIN-ST. JEOR: SCORE: 1735.8

## 2020-11-11 NOTE — PROGRESS NOTES
SUBJECTIVE:   CC: Sascha Joshi is an 50 year old male who presents for preventative health visit.         Patient has been advised of split billing requirements and indicates understanding: Yes  Healthy Habits:     Getting at least 3 servings of Calcium per day:  Yes    Bi-annual eye exam:  Yes    Dental care twice a year:  Yes    Sleep apnea or symptoms of sleep apnea:  None    Diet:  Regular (no restrictions)    Frequency of exercise:  2-3 days/week    Duration of exercise:  Less than 15 minutes    Taking medications regularly:  Yes    Medication side effects:  Not applicable    PHQ-2 Total Score: 0    Additional concerns today:  No    Flu shot   Form    Today's PHQ-2 Score:   PHQ-2 ( 1999 [employer]) 11/10/2020   Q1: Little interest or pleasure in doing things 0   Q2: Feeling down, depressed or hopeless 0   PHQ-2 Score 0   Q1: Little interest or pleasure in doing things Not at all   Q2: Feeling down, depressed or hopeless Not at all   PHQ-2 Score 0       Abuse: Current or Past(Physical, Sexual or Emotional)- No  Do you feel safe in your environment? Yes        Social History     Tobacco Use     Smoking status: Never Smoker     Smokeless tobacco: Never Used   Substance Use Topics     Alcohol use: Yes     Alcohol/week: 0.0 standard drinks     Comment: 2-3 drinks a week     If you drink alcohol do you typically have >3 drinks per day or >7 drinks per week? No    Alcohol Use 11/10/2020   Prescreen: >3 drinks/day or >7 drinks/week? No   Prescreen: >3 drinks/day or >7 drinks/week? -   No flowsheet data found.    Last PSA: No results found for: PSA    Reviewed orders with patient. Reviewed health maintenance and updated orders accordingly - Yes  Lab work is in process  Labs reviewed in EPIC  BP Readings from Last 3 Encounters:   11/11/20 110/68   10/24/19 112/68   08/14/19 114/70    Wt Readings from Last 3 Encounters:   11/11/20 83.3 kg (183 lb 9.6 oz)   10/24/19 86.2 kg (190 lb)   08/14/19 83.9 kg (185 lb)                   Patient Active Problem List   Diagnosis     NO ACTIVE PROBLEMS     Dermatofibroma of lower extremity     Cervical radiculopathy     Past Surgical History:   Procedure Laterality Date     HERNIA REPAIR, INGUINAL RT/LT  2005       Social History     Tobacco Use     Smoking status: Never Smoker     Smokeless tobacco: Never Used   Substance Use Topics     Alcohol use: Yes     Alcohol/week: 0.0 standard drinks     Comment: 2-3 drinks a week     Family History   Problem Relation Age of Onset     C.A.D. Mother         heart attack age 63     Coronary Artery Disease Mother      Hypertension Mother      Glaucoma Brother      Cerebrovascular Disease Paternal Grandmother      Asthma Sister          No current outpatient medications on file.     Allergies   Allergen Reactions     Nkda [No Known Drug Allergies]      Recent Labs   Lab Test 11/11/20  1016 10/31/19  0807   * 96   HDL 40 46   TRIG 55 56   ALT  --  24   CR  --  0.92   GFRESTIMATED  --  >90   GFRESTBLACK  --  >90   POTASSIUM  --  4.1        Reviewed and updated as needed this visit by clinical staff  Tobacco  Allergies  Meds   Med Hx  Surg Hx  Fam Hx  Soc Hx        Reviewed and updated as needed this visit by Provider        Fam Hx             Review of Systems   Constitutional: Negative for chills and fever.   HENT: Negative for congestion, ear pain, hearing loss and sore throat.    Eyes: Negative for pain and visual disturbance.   Respiratory: Negative for cough and shortness of breath.    Cardiovascular: Negative for chest pain, palpitations and peripheral edema.   Gastrointestinal: Negative for abdominal pain, constipation, diarrhea, heartburn, hematochezia and nausea.   Genitourinary: Negative for discharge, dysuria, frequency, genital sores, hematuria, impotence and urgency.   Musculoskeletal: Negative for arthralgias, joint swelling and myalgias.   Skin: Negative for rash.   Neurological: Negative for dizziness, weakness, headaches and  paresthesias.   Psychiatric/Behavioral: Negative for mood changes. The patient is not nervous/anxious.        OBJECTIVE:   /68   Pulse 80   Temp 97.9  F (36.6  C) (Oral)   Resp 16   Ht 1.829 m (6')   Wt 83.3 kg (183 lb 9.6 oz)   BMI 24.90 kg/m      Physical Exam  GENERAL: healthy, alert and no distress  EYES: Eyes grossly normal to inspection, PERRL and conjunctivae and sclerae normal  HENT: ear canals and TM's normal, nose and mouth without ulcers or lesions  NECK: no adenopathy, no asymmetry, masses, or scars and thyroid normal to palpation  RESP: lungs clear to auscultation - no rales, rhonchi or wheezes  CV: regular rate and rhythm, normal S1 S2, no S3 or S4, no murmur, click or rub, no peripheral edema and peripheral pulses strong  ABDOMEN: soft, nontender, no hepatosplenomegaly, no masses and bowel sounds normal  MS: no gross musculoskeletal defects noted, no edema  SKIN: no suspicious lesions or rashes  NEURO: Normal strength and tone, mentation intact and speech normal  PSYCH: mentation appears normal, affect normal/bright    Diagnostic Test Results:  Labs reviewed in Epic  Labs ordered, results pending    ASSESSMENT/PLAN:     (Z00.00) Routine general medical examination at a health care facility  (primary encounter diagnosis)  Comment:   Plan: INFLUENZA VACCINE IM > 6 MONTHS VALENT IIV4         [52326], Lipid panel reflex to direct LDL         Fasting, Glucose, PSA, screen            (Z13.1) Screening for diabetes mellitus  Comment:   Plan: done today    (Z13.220) Screening for hyperlipidemia  Comment:   Plan: done today    (Z12.5) Screening for prostate cancer  Comment:   Plan: PSA, screen        Done today.    COUNSELING:   Reviewed preventive health counseling, as reflected in patient instructions    Estimated body mass index is 24.9 kg/m  as calculated from the following:    Height as of this encounter: 1.829 m (6').    Weight as of this encounter: 83.3 kg (183 lb 9.6 oz).         He reports  that he has never smoked. He has never used smokeless tobacco.      Counseling Resources:  ATP IV Guidelines  Pooled Cohorts Equation Calculator  FRAX Risk Assessment  ICSI Preventive Guidelines  Dietary Guidelines for Americans, 2010  USDA's MyPlate  ASA Prophylaxis  Lung CA Screening    SOURAV Sousa CNP  [facility]

## 2020-11-12 NOTE — RESULT ENCOUNTER NOTE
Derrell Marrufo,    This note is to let you know that all of your blood test results look great and your prostate screening antigen for prostate cancer came back normal.  Let me know if you have any questions.    Elizabeth BENJAMIN CNP

## 2020-11-20 ENCOUNTER — MYC MEDICAL ADVICE (OUTPATIENT)
Dept: FAMILY MEDICINE | Facility: CLINIC | Age: 50
End: 2020-11-20

## 2020-11-20 ENCOUNTER — OFFICE VISIT (OUTPATIENT)
Dept: FAMILY MEDICINE | Facility: CLINIC | Age: 50
End: 2020-11-20
Payer: COMMERCIAL

## 2020-11-20 VITALS — SYSTOLIC BLOOD PRESSURE: 105 MMHG | DIASTOLIC BLOOD PRESSURE: 68 MMHG | HEART RATE: 58 BPM

## 2020-11-20 DIAGNOSIS — M76.62 TENDONITIS, ACHILLES, LEFT: Primary | ICD-10-CM

## 2020-11-20 PROCEDURE — 99213 OFFICE O/P EST LOW 20 MIN: CPT | Performed by: FAMILY MEDICINE

## 2020-11-20 NOTE — PATIENT INSTRUCTIONS
"ASSESSMENT AND PLAN  1. Tendonitis, Achilles, left  Ice frequently 3-4 times daily and rest for 3-4 days, start physical therapy.  Taught gentle calf exercises.     If not improving can consult with podiatry or sports medicine.       - [facility] PT, HAND, AND CHIROPRACTIC REFERRAL; Future        [facility]: [email or web address] , [phone]    E-VISITS CAN BE DONE FOR CARE/PRESCRIPTIONS WHICH MAY NOT NEED AN IN-PERSON ASSESSMENT - click \"on-line care, then request e-visit\".      ONCARE VISIT/PRESCRIPTIONS: [email or web address]  - we treat nearly 50 common conditions with one hour response time     RADIOLOGY SCHEDULING  [facility]:  [phone]   [facility]: [phone]  [facility]: [phone]    Mammogram and Colonoscopy [phone]    Smoking Cessation: www.quitplan.org, 6-906-060-PLAN (0508)    Pharmacy savings card application: www.[email or web address]    CONSUMER PRICE LINE for estimates of test costs:  [phone]       "

## 2020-11-22 NOTE — PROGRESS NOTES
Tendonitis, Achilles, left :unclear what started it.  Perhaps running in old shoes when had calf cramp about a year ago.  Has hurt on and off but more recently.  Has been trying to walk more.          Problem list, Medication list, Allergies, and Medical/Social/Surgical histories reviewed in [facility] and updated as appropriate.  Labs reviewed in EPIC  BP Readings from Last 3 Encounters:   11/20/20 105/68   11/11/20 110/68   10/24/19 112/68    Wt Readings from Last 3 Encounters:   11/11/20 83.3 kg (183 lb 9.6 oz)   10/24/19 86.2 kg (190 lb)   08/14/19 83.9 kg (185 lb)                  Patient Active Problem List   Diagnosis     NO ACTIVE PROBLEMS     Dermatofibroma of lower extremity     Cervical radiculopathy     Past Surgical History:   Procedure Laterality Date     HERNIA REPAIR, INGUINAL RT/LT  2005       Social History     Tobacco Use     Smoking status: Never Smoker     Smokeless tobacco: Never Used   Substance Use Topics     Alcohol use: Yes     Alcohol/week: 0.0 standard drinks     Comment: 2-3 drinks a week     Family History   Problem Relation Age of Onset     C.A.D. Mother         heart attack age 63     Coronary Artery Disease Mother      Hypertension Mother      Glaucoma Brother      Cerebrovascular Disease Paternal Grandmother      Asthma Sister          No current outpatient medications on file.     Allergies   Allergen Reactions     Nkda [No Known Drug Allergies]      Recent Labs   Lab Test 11/11/20  1016 10/31/19  0807   * 96   HDL 40 46   TRIG 55 56   ALT  --  24   CR  --  0.92   GFRESTIMATED  --  >90   GFRESTBLACK  --  >90   POTASSIUM  --  4.1        ROS:  Constitutional, HEENT, cardiovascular, pulmonary, GI, , musculoskeletal, neuro, skin, endocrine and psych systems are negative, except as otherwise noted.        OBJECTIVE:  /68   Pulse 58     EXAM:  GENERAL APPEARANCE: healthy, alert and no distress  Left achilles smoothly swollen about one inch proximal to insertion on back of  calcaneus.  No nodularity.  Tendon larger and more tender than the other side.     ASSESSMENT AND PLAN  Patient Instructions   ASSESSMENT AND PLAN  1. Tendonitis, Achilles, left: by history and exam seems to be in acute phase of injury still. Discussed need for proper aggressive care to prevent chronic problems/scarring/tendinitis/osis.     Ice frequently 3-4 times daily and rest for 3-4 days, start physical therapy to learn eccentric exercises and consider ultrasound therapy.  Taught gentle calf exercises.     If not improving can consult with podiatry or sports medicine, multiple other options still available (prilotherapy, etc.)       - [facility] PT, HAND, AND CHIROPRACTIC REFERRAL; Future              Joel Wegener, MD

## 2020-11-30 ENCOUNTER — THERAPY VISIT (OUTPATIENT)
Dept: PHYSICAL THERAPY | Facility: CLINIC | Age: 50
End: 2020-11-30
Attending: FAMILY MEDICINE
Payer: COMMERCIAL

## 2020-11-30 DIAGNOSIS — M76.62 TENDONITIS, ACHILLES, LEFT: ICD-10-CM

## 2020-11-30 PROCEDURE — 97530 THERAPEUTIC ACTIVITIES: CPT | Mod: GP | Performed by: PHYSICAL THERAPIST

## 2020-11-30 PROCEDURE — 97110 THERAPEUTIC EXERCISES: CPT | Mod: GP | Performed by: PHYSICAL THERAPIST

## 2020-11-30 PROCEDURE — 97161 PT EVAL LOW COMPLEX 20 MIN: CPT | Mod: GP | Performed by: PHYSICAL THERAPIST

## 2020-11-30 NOTE — PROGRESS NOTES
[facility] Initial Evaluation  Subjective:  The history is provided by the patient.   Patient Health History  Sascha Joshi being seen for Achilles tendinitis.     Problem began: 11/18/2020.   Problem occurred: Exercise?   Pain is reported as 1/10 on pain scale.  General health as reported by patient is good.       Medical allergies: none.   Surgeries include:  Other. Other surgery history details: Hernia.    Current medications:  None.    Current occupation .   Primary job tasks include:  Computer work and prolonged sitting.                  Therapist Generated HPI Evaluation  Problem details: Pt reports onset of L achilles tendon pain on 11/18/20. Not sure exactly what caused it to come about. Woke up with it on the morning of 11/18. Has recently been doing calisthenic exercises for fitness. Locates pain on more proximal end of achilles tendon. Describes pain as tension. Pain worse with going up/down stairs, walking (start pain at about 5 min if walking faster) and doing yard work. Pain better with ice, rest, OTC medication. 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         Type of problem:  Left foot.                                                 Objective:  System    Ankle/Foot Evaluation  ROM:  Arom ankle eval: Notes more tension in L calf/achilles tendon with DF PROM.  AROM:    Dorsiflexion:  Left:   8  Right:   8  Plantarflexion:  Left:  52    Right:  52            Strength is normal (No pain noted with x 10 SL heel lifts with L foot).      PALPATION:   Left ankle tenderness present at:  achilles tendon        FUNCTIONAL TESTS:         Quad:  Single Leg Squat Left: Control is mild loss of control, hip substitution and femoral IR.  Single Leg Squat Right: Control is mild loss of control, hip substitution and femoral IR  Bilateral Leg Squat:  Control is normal control                                                        General      ROS    Assessment/Plan:    Patient is a 50 year old male with left side ankle complaints.    Patient has the following significant findings with corresponding treatment plan.                Diagnosis 1:  L achilles tendinopathy  Pain -  hot/cold therapy, US, electric stimulation, mechanical traction, manual therapy, splint/taping/bracing/orthotics, self management, education, directional preference exercise and home program  Decreased ROM/flexibility - manual therapy, therapeutic exercise, therapeutic activity and home program  Decreased joint mobility - manual therapy, therapeutic exercise, therapeutic activity and home program  Decreased strength - therapeutic exercise, therapeutic activities and home program  Impaired muscle performance - neuro re-education and home program  Decreased function - therapeutic activities and home program    Therapy Evaluation Codes:   1) History comprised of:   Personal factors that impact the plan of care:      None.    Comorbidity factors that impact the plan of care are:      None.     Medications impacting care: None.  2) Examination of Body Systems comprised of:   Body structures and functions that impact the plan of care:      Ankle.   Activity limitations that impact the plan of care are:      Sitting, Stairs and Walking.  3) Clinical presentation characteristics are:   Stable/Uncomplicated.  4) Decision-Making    Low complexity using standardized patient assessment instrument and/or measureable assessment of functional outcome.  Cumulative Therapy Evaluation is: Low complexity.    Previous and current functional limitations:  (See Goal Flow Sheet for this information)    Short term and Long term goals: (See Goal Flow Sheet for this information)     Communication ability:  Patient appears to be able to clearly communicate and understand verbal and written communication and follow directions correctly.  Treatment Explanation - The following has been discussed with the  patient:   RX ordered/plan of care  Anticipated outcomes  Possible risks and side effects  This patient would benefit from PT intervention to resume normal activities.   Rehab potential is good.    Frequency:  1 X week, once daily  Duration:  for 6 weeks  Discharge Plan:  Achieve all LTG.  Independent in home treatment program.  Reach maximal therapeutic benefit.    Please refer to the daily flowsheet for treatment today, total treatment time and time spent performing 1:1 timed codes.

## 2020-12-08 ENCOUNTER — THERAPY VISIT (OUTPATIENT)
Dept: PHYSICAL THERAPY | Facility: CLINIC | Age: 50
End: 2020-12-08
Payer: COMMERCIAL

## 2020-12-08 DIAGNOSIS — M77.9 TENDINITIS: Primary | ICD-10-CM

## 2020-12-08 DIAGNOSIS — M25.572 ANKLE PAIN, LEFT: ICD-10-CM

## 2020-12-08 PROCEDURE — 97112 NEUROMUSCULAR REEDUCATION: CPT | Mod: GP | Performed by: PHYSICAL THERAPIST

## 2020-12-08 PROCEDURE — 97140 MANUAL THERAPY 1/> REGIONS: CPT | Mod: GP | Performed by: PHYSICAL THERAPIST

## 2020-12-08 PROCEDURE — 97110 THERAPEUTIC EXERCISES: CPT | Mod: GP | Performed by: PHYSICAL THERAPIST

## 2021-05-17 NOTE — PROGRESS NOTES
Discharge Note    Progress reporting period is from initial eval to Dec 8, 2020.     Sascha failed to return for next follow up visit and current status is unknown.  Please see information below for last relevant information on current status.  Patient seen for 2 visits.    SUBJECTIVE  Subjective changes noted by patient:  Pt overall feeling about the same with his sxs. Notes that his pain will get a little worse after he does the exercises, but goes back down to baseline the next day. HEP going well, doing consistently as outlined.  .  Current pain level is 1/10.     Previous pain level was  1/10.   Changes in function:  Yes (See Goal flowsheet attached for changes in current functional level)  Adverse reaction to treatment or activity: None    OBJECTIVE  Changes noted in objective findings: 1/10 pain after a set of 10 SL heel raises with LLE     ASSESSMENT/PLAN  Diagnosis: L achilles tendinitis   DIAGP:  The primary encounter diagnosis was Tendinitis. A diagnosis of Ankle pain, left was also pertinent to this visit.  STG/LTGs have been met or progress has been made towards goals:  Yes, please see goal flowsheet for most current information  Assessment of Progress: current status is unknown.    Last current status:     Self Management Plans:  HEP  I have re-evaluated this patient and find that the nature, scope, duration and intensity of the therapy is appropriate for the medical condition of the patient.  Sascha continues to require the following intervention to meet STG and LTG's:  HEP.    Recommendations:  Discharge with current home program.  Patient to follow up with MD as needed.    Please refer to the daily flowsheet for treatment today, total treatment time and time spent performing 1:1 timed codes.

## 2021-09-11 ENCOUNTER — HEALTH MAINTENANCE LETTER (OUTPATIENT)
Age: 51
End: 2021-09-11

## 2021-11-14 ASSESSMENT — ENCOUNTER SYMPTOMS
DIARRHEA: 0
DYSURIA: 0
HEMATURIA: 0
HEARTBURN: 0
SORE THROAT: 0
SHORTNESS OF BREATH: 0
ABDOMINAL PAIN: 0
COUGH: 0
CHILLS: 0
PALPITATIONS: 0
PARESTHESIAS: 0
HEMATOCHEZIA: 0
NAUSEA: 0
WEAKNESS: 0
EYE PAIN: 0
JOINT SWELLING: 0
NERVOUS/ANXIOUS: 0
MYALGIAS: 0
CONSTIPATION: 0
DIZZINESS: 0
HEADACHES: 0
FREQUENCY: 0
FEVER: 0
ARTHRALGIAS: 0

## 2021-11-15 ENCOUNTER — OFFICE VISIT (OUTPATIENT)
Dept: FAMILY MEDICINE | Facility: CLINIC | Age: 51
End: 2021-11-15
Payer: COMMERCIAL

## 2021-11-15 VITALS
DIASTOLIC BLOOD PRESSURE: 69 MMHG | TEMPERATURE: 97.4 F | SYSTOLIC BLOOD PRESSURE: 124 MMHG | HEART RATE: 80 BPM | WEIGHT: 183 LBS | BODY MASS INDEX: 23.49 KG/M2 | RESPIRATION RATE: 16 BRPM | HEIGHT: 74 IN | OXYGEN SATURATION: 98 %

## 2021-11-15 DIAGNOSIS — Z00.00 HEALTH CARE MAINTENANCE: ICD-10-CM

## 2021-11-15 DIAGNOSIS — Z13.0 SCREENING, ANEMIA, DEFICIENCY, IRON: ICD-10-CM

## 2021-11-15 DIAGNOSIS — M67.879 ACHILLES TENDON MASS: ICD-10-CM

## 2021-11-15 DIAGNOSIS — Z13.6 ENCOUNTER FOR LIPID SCREENING FOR CARDIOVASCULAR DISEASE: ICD-10-CM

## 2021-11-15 DIAGNOSIS — Z23 NEED FOR PROPHYLACTIC VACCINATION AND INOCULATION AGAINST INFLUENZA: ICD-10-CM

## 2021-11-15 DIAGNOSIS — Z13.220 ENCOUNTER FOR LIPID SCREENING FOR CARDIOVASCULAR DISEASE: ICD-10-CM

## 2021-11-15 DIAGNOSIS — Z00.00 ROUTINE GENERAL MEDICAL EXAMINATION AT A HEALTH CARE FACILITY: Primary | ICD-10-CM

## 2021-11-15 DIAGNOSIS — M79.18 PAIN OF RIGHT DELTOID: ICD-10-CM

## 2021-11-15 DIAGNOSIS — Z23 NEED FOR SHINGLES VACCINE: ICD-10-CM

## 2021-11-15 DIAGNOSIS — Z12.11 COLON CANCER SCREENING: ICD-10-CM

## 2021-11-15 DIAGNOSIS — Z13.1 SCREENING FOR DIABETES MELLITUS: ICD-10-CM

## 2021-11-15 DIAGNOSIS — Z23 HIGH PRIORITY FOR COVID-19 VACCINATION: ICD-10-CM

## 2021-11-15 LAB
BASOPHILS # BLD AUTO: 0.1 10E3/UL (ref 0–0.2)
BASOPHILS NFR BLD AUTO: 1 %
EOSINOPHIL # BLD AUTO: 0.2 10E3/UL (ref 0–0.7)
EOSINOPHIL NFR BLD AUTO: 2 %
ERYTHROCYTE [DISTWIDTH] IN BLOOD BY AUTOMATED COUNT: 12.7 % (ref 10–15)
HCT VFR BLD AUTO: 41.7 % (ref 40–53)
HGB BLD-MCNC: 14.1 G/DL (ref 13.3–17.7)
IMM GRANULOCYTES # BLD: 0 10E3/UL
IMM GRANULOCYTES NFR BLD: 0 %
LYMPHOCYTES # BLD AUTO: 2.1 10E3/UL (ref 0.8–5.3)
LYMPHOCYTES NFR BLD AUTO: 30 %
MCH RBC QN AUTO: 31.1 PG (ref 26.5–33)
MCHC RBC AUTO-ENTMCNC: 33.8 G/DL (ref 31.5–36.5)
MCV RBC AUTO: 92 FL (ref 78–100)
MONOCYTES # BLD AUTO: 0.6 10E3/UL (ref 0–1.3)
MONOCYTES NFR BLD AUTO: 9 %
NEUTROPHILS # BLD AUTO: 4.1 10E3/UL (ref 1.6–8.3)
NEUTROPHILS NFR BLD AUTO: 58 %
PLATELET # BLD AUTO: 226 10E3/UL (ref 150–450)
RBC # BLD AUTO: 4.53 10E6/UL (ref 4.4–5.9)
WBC # BLD AUTO: 7 10E3/UL (ref 4–11)

## 2021-11-15 PROCEDURE — 86803 HEPATITIS C AB TEST: CPT | Performed by: FAMILY MEDICINE

## 2021-11-15 PROCEDURE — 87389 HIV-1 AG W/HIV-1&-2 AB AG IA: CPT | Performed by: FAMILY MEDICINE

## 2021-11-15 PROCEDURE — 36415 COLL VENOUS BLD VENIPUNCTURE: CPT | Performed by: FAMILY MEDICINE

## 2021-11-15 PROCEDURE — 90682 RIV4 VACC RECOMBINANT DNA IM: CPT | Performed by: FAMILY MEDICINE

## 2021-11-15 PROCEDURE — 99396 PREV VISIT EST AGE 40-64: CPT | Mod: 25 | Performed by: FAMILY MEDICINE

## 2021-11-15 PROCEDURE — 0004A COVID-19,PF,PFIZER (12+ YRS): CPT | Performed by: FAMILY MEDICINE

## 2021-11-15 PROCEDURE — 90471 IMMUNIZATION ADMIN: CPT | Performed by: FAMILY MEDICINE

## 2021-11-15 PROCEDURE — 91300 COVID-19,PF,PFIZER (12+ YRS): CPT | Performed by: FAMILY MEDICINE

## 2021-11-15 PROCEDURE — 80061 LIPID PANEL: CPT | Performed by: FAMILY MEDICINE

## 2021-11-15 PROCEDURE — 85025 COMPLETE CBC W/AUTO DIFF WBC: CPT | Performed by: FAMILY MEDICINE

## 2021-11-15 PROCEDURE — 80053 COMPREHEN METABOLIC PANEL: CPT | Performed by: FAMILY MEDICINE

## 2021-11-15 ASSESSMENT — ENCOUNTER SYMPTOMS
SORE THROAT: 0
WEAKNESS: 0
NAUSEA: 0
ABDOMINAL PAIN: 0
SHORTNESS OF BREATH: 0
COUGH: 0
CHILLS: 0
HEARTBURN: 0
CONSTIPATION: 0
FEVER: 0
MYALGIAS: 0
ARTHRALGIAS: 0
DYSURIA: 0
HEADACHES: 0
PARESTHESIAS: 0
EYE PAIN: 0
NERVOUS/ANXIOUS: 0
HEMATURIA: 0
JOINT SWELLING: 0
DIARRHEA: 0
FREQUENCY: 0
DIZZINESS: 0
HEMATOCHEZIA: 0
PALPITATIONS: 0

## 2021-11-15 ASSESSMENT — MIFFLIN-ST. JEOR: SCORE: 1751.89

## 2021-11-15 NOTE — PROGRESS NOTES
"SUBJECTIVE:   CC: Sascha Joshi is an 51 year old male who presents for preventative health visit.     Patient has been advised of split billing requirements and indicates understanding: Yes  Healthy Habits:     Getting at least 3 servings of Calcium per day:  Yes    Bi-annual eye exam:  NO    Dental care twice a year:  Yes    Sleep apnea or symptoms of sleep apnea:  None    Diet:  Regular (no restrictions)    Frequency of exercise:  2-3 days/week    Duration of exercise:  Less than 15 minutes    Taking medications regularly:  Yes    Medication side effects:  Not applicable    PHQ-2 Total Score: 0    Additional concerns today:  No  2. BIO SCREENING FORMS   Waist circumference: 96.5 cm (38\") Alejandra Griffith MA     Hx of prior dermatofibroma, cervical radiculopathy, FH of heart disease, previously under care of PCP Dr Galvan, the seen by Dr Cooper in 2019, seen last by andrea Winkler in nov 2020 for a physical then by Dr Wegener for left achillis tendonitis for which did PT 2 sessions.     Is here for a routine physical. New to this provider currently has no acute concerns  No  concerns  Health care maintenance reviewed  Due for colon cancer screening  Will get flu shot today   Lives in [place], works in sales, visits customers, would qualify for Covid booster. Notes father had Covid recently but was vaccinated and thankfully was a mild illness.   Tdap due in 2022  Will do labs today   Has advance directives, wife POA    Wants to bring to attention couple things  Has a bump left achilles area no pain. Noted last year. Did PT for achilles tendonitis. Given exercises that helps loosen it up. Has no issues with activity or walking . Back of knee seems to also flare up at times but exercise clears it up.    Is left handed. Noted right deltoid area discomfort while golfing. Easing up last 6 weeks.no tingling , numbness, dropping stuff and does not need a referral right now but just would like to mention for " now.    Today's PHQ-2 Score:   PHQ-2 (  [employer]) 2021   Q1: Little interest or pleasure in doing things 0   Q2: Feeling down, depressed or hopeless 0   PHQ-2 Score 0   Q1: Little interest or pleasure in doing things Not at all   Q2: Feeling down, depressed or hopeless Not at all   PHQ-2 Score 0       Abuse: Current or Past(Physical, Sexual or Emotional)- No  Do you feel safe in your environment? Yes    Have you ever done Advance Care Planning? (For example, a Health Directive, POLST, or a discussion with a medical provider or your loved ones about your wishes): Yes, patient states has an Advance Care Planning document and will bring a copy to the clinic.    Social History     Tobacco Use     Smoking status: Never Smoker     Smokeless tobacco: Never Used   Substance Use Topics     Alcohol use: Yes     Alcohol/week: 0.0 standard drinks     Comment: 2-3 drinks a week     If you drink alcohol do you typically have >3 drinks per day or >7 drinks per week? No    Alcohol Use 11/15/2021   Prescreen: >3 drinks/day or >7 drinks/week? -   Prescreen: >3 drinks/day or >7 drinks/week? No       Last PSA:   PSA   Date Value Ref Range Status   2020 1.41 0 - 4 ug/L Final     Comment:     Assay Method:  Chemiluminescence using Siemens Vista analyzer       Reviewed orders with patient. Reviewed health maintenance and updated orders accordingly - Yes  Lab work is in process  Labs reviewed in EPIC  BP Readings from Last 3 Encounters:   11/15/21 124/69   20 105/68   20 110/68    Wt Readings from Last 3 Encounters:   11/15/21 83 kg (183 lb)   20 83.3 kg (183 lb 9.6 oz)   10/24/19 86.2 kg (190 lb)          Patient Active Problem List   Diagnosis     NO ACTIVE PROBLEMS     Dermatofibroma of lower extremity     Cervical radiculopathy     Achilles tendon mass     Pain of right deltoid     Past Surgical History:   Procedure Laterality Date     HERNIA REPAIR, INGUINAL RT/LT        CIRCUMCISION          Social History     Tobacco Use     Smoking status: Never Smoker     Smokeless tobacco: Never Used   Substance Use Topics     Alcohol use: Yes     Alcohol/week: 0.0 standard drinks     Comment: 2-3 drinks a week     Family History   Problem Relation Age of Onset     C.A.D. Mother         heart attack age 63     Coronary Artery Disease Mother         SMOKER, HAD SCRLETT FEVER WHEN YOUNG, HAD A VALVE REPLCEMENT     Hypertension Mother      Mitral Valve Replacement Mother      Asthma Sister      Glaucoma Brother      Glaucoma Brother      Cerebrovascular Disease Paternal Grandmother          No current outpatient medications on file.     Allergies   Allergen Reactions     Nkda [No Known Drug Allergies]      Recent Labs   Lab Test 11/15/21  1641 20  1016 10/31/19  0807   LDL 88 110* 96   HDL 50 40 46   TRIG 82 55 56   ALT 23  --  24   CR 0.81  --  0.92   GFRESTIMATED >90  --  >90   GFRESTBLACK  --   --  >90   POTASSIUM 3.7  --  4.1        Reviewed and updated as needed this visit by clinical staff  Tobacco  Allergies  Meds  Problems  Med Hx  Surg Hx  Fam Hx  Soc Hx         Reviewed and updated as needed this visit by Provider  Tobacco  Allergies  Meds  Problems  Med Hx  Surg Hx  Fam Hx        Past Medical History:   Diagnosis Date     NO ACTIVE PROBLEMS       Past Surgical History:   Procedure Laterality Date     HERNIA REPAIR, INGUINAL RT/LT        CIRCUMCISION       OB History   No obstetric history on file.       Review of Systems   Constitutional: Negative for chills and fever.   HENT: Negative for congestion, ear pain, hearing loss and sore throat.    Eyes: Negative for pain and visual disturbance.   Respiratory: Negative for cough and shortness of breath.    Cardiovascular: Negative for chest pain, palpitations and peripheral edema.   Gastrointestinal: Negative for abdominal pain, constipation, diarrhea, heartburn, hematochezia and nausea.   Genitourinary: Negative for dysuria,  "frequency, genital sores, hematuria, impotence, penile discharge and urgency.   Musculoskeletal: Negative for arthralgias, joint swelling and myalgias.   Skin: Negative for rash.   Neurological: Negative for dizziness, weakness, headaches and paresthesias.   Psychiatric/Behavioral: Negative for mood changes. The patient is not nervous/anxious.      OBJECTIVE:   /69 (BP Location: Right arm, Patient Position: Chair, Cuff Size: Adult Regular)   Pulse 80   Temp 97.4  F (36.3  C) (Tympanic)   Resp 16   Ht 1.867 m (6' 1.5\")   Wt 83 kg (183 lb)   SpO2 98%   BMI 23.82 kg/m   Waist circumference: 96.5 cm (38\") Alejandra Griffith MA    Physical Exam  GENERAL: healthy, alert and no distress  EYES: Eyes grossly normal to inspection, PERRL and conjunctivae and sclerae normal  HENT: ear canals and TM's normal, nose and mouth without ulcers or lesions  NECK: no adenopathy, no asymmetry, masses, or scars and thyroid normal to palpation  RESP: lungs clear to auscultation - no rales, rhonchi or wheezes  CV: regular rate and rhythm, normal S1 S2, no S3 or S4, no murmur, click or rub, no peripheral edema and peripheral pulses strong  ABDOMEN: soft, non tender, no hepatosplenomegaly, no masses and bowel sounds normal  MS: no gross musculoskeletal defects noted, no edema, soft tissue lump like midway up achilles tendon on left, non tender  SKIN: no suspicious lesions or rashes  NEURO: Normal strength and tone, mentation intact and speech normal  PSYCH: mentation appears normal, affect normal/bright    Diagnostic Test Results:  Labs reviewed in Epic  Results for orders placed or performed in visit on 11/15/21   Comprehensive metabolic panel (BMP + Alb, Alk Phos, ALT, AST, Total. Bili, TP)     Status: Normal   Result Value Ref Range    Sodium 140 133 - 144 mmol/L    Potassium 3.7 3.4 - 5.3 mmol/L    Chloride 107 94 - 109 mmol/L    Carbon Dioxide (CO2) 26 20 - 32 mmol/L    Anion Gap 7 3 - 14 mmol/L    Urea Nitrogen 15 7 - 30 mg/dL    " Creatinine 0.81 0.66 - 1.25 mg/dL    Calcium 8.9 8.5 - 10.1 mg/dL    Glucose 77 70 - 99 mg/dL    Alkaline Phosphatase 76 40 - 150 U/L    AST 18 0 - 45 U/L    ALT 23 0 - 70 U/L    Protein Total 7.2 6.8 - 8.8 g/dL    Albumin 3.7 3.4 - 5.0 g/dL    Bilirubin Total 0.7 0.2 - 1.3 mg/dL    GFR Estimate >90 >60 mL/min/1.73m2   Lipid Profile (Chol, Trig, HDL, LDL calc)     Status: None   Result Value Ref Range    Cholesterol 154 <200 mg/dL    Triglycerides 82 <150 mg/dL    Direct Measure HDL 50 >=40 mg/dL    LDL Cholesterol Calculated 88 <=100 mg/dL    Non HDL Cholesterol 104 <130 mg/dL    Patient Fasting > 8hrs? No     Narrative    Cholesterol  Desirable:  <200 mg/dL    Triglycerides  Normal:  Less than 150 mg/dL  Borderline High:  150-199 mg/dL  High:  200-499 mg/dL  Very High:  Greater than or equal to 500 mg/dL    Direct Measure HDL  Female:  Greater than or equal to 50 mg/dL   Male:  Greater than or equal to 40 mg/dL    LDL Cholesterol  Desirable:  <100mg/dL  Above Desirable:  100-129 mg/dL   Borderline High:  130-159 mg/dL   High:  160-189 mg/dL   Very High:  >= 190 mg/dL    Non HDL Cholesterol  Desirable:  130 mg/dL  Above Desirable:  130-159 mg/dL  Borderline High:  160-189 mg/dL  High:  190-219 mg/dL  Very High:  Greater than or equal to 220 mg/dL   HIV Antigen Antibody Combo     Status: Normal   Result Value Ref Range    HIV Antigen Antibody Combo Nonreactive Nonreactive   Hepatitis C Screen Reflex to HCV RNA Quant and Genotype     Status: Normal   Result Value Ref Range    Hepatitis C Antibody Nonreactive Nonreactive    Narrative    Assay performance characteristics have not been established for newborns, infants, and children.   CBC with platelets and differential     Status: None   Result Value Ref Range    WBC Count 7.0 4.0 - 11.0 10e3/uL    RBC Count 4.53 4.40 - 5.90 10e6/uL    Hemoglobin 14.1 13.3 - 17.7 g/dL    Hematocrit 41.7 40.0 - 53.0 %    MCV 92 78 - 100 fL    MCH 31.1 26.5 - 33.0 pg    MCHC 33.8 31.5 -  36.5 g/dL    RDW 12.7 10.0 - 15.0 %    Platelet Count 226 150 - 450 10e3/uL    % Neutrophils 58 %    % Lymphocytes 30 %    % Monocytes 9 %    % Eosinophils 2 %    % Basophils 1 %    % Immature Granulocytes 0 %    Absolute Neutrophils 4.1 1.6 - 8.3 10e3/uL    Absolute Lymphocytes 2.1 0.8 - 5.3 10e3/uL    Absolute Monocytes 0.6 0.0 - 1.3 10e3/uL    Absolute Eosinophils 0.2 0.0 - 0.7 10e3/uL    Absolute Basophils 0.1 0.0 - 0.2 10e3/uL    Absolute Immature Granulocytes 0.0 <=0.0 10e3/uL   CBC with platelets and differential     Status: None    Narrative    The following orders were created for panel order CBC with platelets and differential.  Procedure                               Abnormality         Status                     ---------                               -----------         ------                     CBC with platelets and d...[[id number]]                      Final result                 Please view results for these tests on the individual orders.       ASSESSMENT/PLAN:       ICD-10-CM    1. Routine general medical examination at a health care facility  Z00.00 INFLUENZA QUAD, RECOMBINANT, P-FREE (RIV4) (FLUBLOK)     COLOGUARD([employer]ACT SCI[employer]ES)     COVID-19,PF,PFIZER (12+ Yrs PURPLE LABEL)     CBC with platelets and differential     Comprehensive metabolic panel (BMP + Alb, Alk Phos, ALT, AST, Total. Bili, TP)     Lipid Profile (Chol, Trig, HDL, LDL calc)     HIV Antigen Antibody Combo     Hepatitis C Screen Reflex to HCV RNA Quant and Genotype     CBC with platelets and differential     Comprehensive metabolic panel (BMP + Alb, Alk Phos, ALT, AST, Total. Bili, TP)     Lipid Profile (Chol, Trig, HDL, LDL calc)     HIV Antigen Antibody Combo     Hepatitis C Screen Reflex to HCV RNA Quant and Genotype   2. Achilles tendon mass  M67.879    3. Pain of right deltoid  M79.18    4. Health care maintenance  Z00.00 REVIEW OF HEALTH MAINTENANCE PROTOCOL ORDERS   5. High priority for COVID-19 vaccination  Z23  COVID-19,PF,PFIZER (12+ Yrs PURPLE LABEL)   6. Colon cancer screening  Z12.11 COLOGUARD(EXACT SCIENCES)   7. Need for prophylactic vaccination and inoculation against influenza  Z23 INFLUENZA QUAD, RECOMBINANT, P-FREE (RIV4) (FLUBLOK)   8. Need for shingles vaccine  Z23    9. Screening, anemia, deficiency, iron  Z13.0 CBC with platelets and differential     CBC with platelets and differential   10. Screening for diabetes mellitus  Z13.1 Comprehensive metabolic panel (BMP + Alb, Alk Phos, ALT, AST, Total. Bili, TP)     Comprehensive metabolic panel (BMP + Alb, Alk Phos, ALT, AST, Total. Bili, TP)   11. Encounter for lipid screening for cardiovascular disease  Z13.220 Lipid Profile (Chol, Trig, HDL, LDL calc)    Z13.6 Lipid Profile (Chol, Trig, HDL, LDL calc)     Seen for preventive health and additional concerns today   Self testicular check regularly   No  concerns  Health care maintenance reviewed  Due for colon cancer screening. Options discussed. Will do cologuard this year  Flu shot given  Covid booster given   Tdap due in 2022  Consider Shingrex: shingles vaccines (series of 2 shots 2 to 6 months apart that can cause couple days of flu like symptoms but is expensive so check with insurance and get at pharmacy where cheaper).  Has advance directives, wife POA  Labs today and will make further recommendations once reviewed  Left achilles tendon mass, likely scarring for a prior injury. Managing with home exercises an self cares when flares up  Right deltoid area pain after golfing now improved. Exam benign. likely had overuse repetitive strain/ rotator cuff syndrome. Monitor can refer to ortho if worsens.   Return yearly for a preventive physical and sooner in an office visit for any new or worsening symptoms.     Patient has been advised of split billing requirements and indicates understanding: Yes  COUNSELING:   Reviewed preventive health counseling, as reflected in patient instructions       Regular  "exercise       Healthy diet/nutrition       Vision screening       Immunizations    Vaccinated for: Influenza         Alcohol Use        Consider Hep C screening for all patients one time for ages 18-79 years       HIV screeninx in teen years, 1x in adult years, and at intervals if high risk       Colon cancer screening       Prostate cancer screening       The 10-year ASCVD risk score (Toshia RODRIGUEZ Jr., et al., 2013) is: 2.3%    Values used to calculate the score:      Age: 51 years      Sex: Male      Is Non- : No      Diabetic: No      Tobacco smoker: No      Systolic Blood Pressure: 124 mmHg      Is BP treated: No      HDL Cholesterol: 50 mg/dL      Total Cholesterol: 154 mg/dL       Advance Care Planning    Estimated body mass index is 23.82 kg/m  as calculated from the following:    Height as of this encounter: 1.867 m (6' 1.5\").    Weight as of this encounter: 83 kg (183 lb).     He reports that he has never smoked. He has never used smokeless tobacco.      Counseling Resources:  ATP IV Guidelines  Pooled Cohorts Equation Calculator  FRAX Risk Assessment  ICSI Preventive Guidelines  Dietary Guidelines for Americans,   USDA's MyPlate  ASA Prophylaxis  Lung CA Screening    Chantel Servin MD  [facility]    "

## 2021-11-15 NOTE — RESULT ENCOUNTER NOTE
Derrell Mr. Joshi,  Some of your results came back and are within acceptable limits. -Normal red blood cell (hgb) levels, normal white blood cell count and normal platelet levels.. If you have any further concerns please do not hesitate to contact us by message, phone or making an appointment.  Have a good day   Dr Gareth VIZCARRA

## 2021-11-15 NOTE — PATIENT INSTRUCTIONS
SHINGREX shingles vaccines (series of 2 shots 2 to 6 months apart that can cause couple days of flu like symptoms but is expensive so check with insurance and get at pharmacy where cheaper).    Preventive Health Recommendations  Male Ages 50 - 64    Yearly exam:             See your health care provider every year in order to  o   Review health changes.   o   Discuss preventive care.    o   Review your medicines if your doctor has prescribed any.     Have a cholesterol test every 5 years, or more frequently if you are at risk for high cholesterol/heart disease.     Have a diabetes test (fasting glucose) every three years. If you are at risk for diabetes, you should have this test more often.     Have a colonoscopy at age 50, or have a yearly FIT test (stool test). These exams will check for colon cancer.      Talk with your health care provider about whether or not a prostate cancer screening test (PSA) is right for you.    You should be tested each year for STDs (sexually transmitted diseases), if you re at risk.     Shots: Get a flu shot each year. Get a tetanus shot every 10 years.     Nutrition:    Eat at least 5 servings of fruits and vegetables daily.     Eat whole-grain bread, whole-wheat pasta and brown rice instead of white grains and rice.     Get adequate Calcium and Vitamin D.     Lifestyle    Exercise for at least 150 minutes a week (30 minutes a day, 5 days a week). This will help you control your weight and prevent disease.     Limit alcohol to one drink per day.     No smoking.     Wear sunscreen to prevent skin cancer.     See your dentist every six months for an exam and cleaning.     See your eye doctor every 1 to 2 years.

## 2021-11-15 NOTE — NURSING NOTE
.Prior to immunization administration, verified patients identity using patient s name and date of birth. Please see Immunization Activity for additional information.     Screening Questionnaire for Adult Immunization    Are you sick today?   No   Do you have allergies to medications, food, a vaccine component or latex?   No   Have you ever had a serious reaction after receiving a vaccination?   No   Do you have a long-term health problem with heart, lung, kidney, or metabolic disease (e.g., diabetes), asthma, a blood disorder, no spleen, complement component deficiency, a cochlear implant, or a spinal fluid leak?  Are you on long-term aspirin therapy?   No   Do you have cancer, leukemia, HIV/AIDS, or any other immune system problem?   No   Do you have a parent, brother, or sister with an immune system problem?   No   In the past 3 months, have you taken medications that affect  your immune system, such as prednisone, other steroids, or anticancer drugs; drugs for the treatment of rheumatoid arthritis, Crohn s disease, or psoriasis; or have you had radiation treatments?   No   Have you had a seizure, or a brain or other nervous system problem?   No   During the past year, have you received a transfusion of blood or blood    products, or been given immune (gamma) globulin or antiviral drug?   No   For women: Are you pregnant or is there a chance you could become       pregnant during the next month?   No   Have you received any vaccinations in the past 4 weeks?   No     Immunization questionnaire answers were all negative.        Per orders of Dr. Servin, injection of flu & pfizer 3rd dose (booster) given by Camelia Burks. Patient instructed to remain in clinic for 15 minutes afterwards, and to report any adverse reaction to me immediately.       Screening performed by Camelia Burks on 11/15/2021 at 4:36 PM.

## 2021-11-16 LAB
ALBUMIN SERPL-MCNC: 3.7 G/DL (ref 3.4–5)
ALP SERPL-CCNC: 76 U/L (ref 40–150)
ALT SERPL W P-5'-P-CCNC: 23 U/L (ref 0–70)
ANION GAP SERPL CALCULATED.3IONS-SCNC: 7 MMOL/L (ref 3–14)
AST SERPL W P-5'-P-CCNC: 18 U/L (ref 0–45)
BILIRUB SERPL-MCNC: 0.7 MG/DL (ref 0.2–1.3)
BUN SERPL-MCNC: 15 MG/DL (ref 7–30)
CALCIUM SERPL-MCNC: 8.9 MG/DL (ref 8.5–10.1)
CHLORIDE BLD-SCNC: 107 MMOL/L (ref 94–109)
CHOLEST SERPL-MCNC: 154 MG/DL
CO2 SERPL-SCNC: 26 MMOL/L (ref 20–32)
CREAT SERPL-MCNC: 0.81 MG/DL (ref 0.66–1.25)
FASTING STATUS PATIENT QL REPORTED: NO
GFR SERPL CREATININE-BSD FRML MDRD: >90 ML/MIN/1.73M2
GLUCOSE BLD-MCNC: 77 MG/DL (ref 70–99)
HCV AB SERPL QL IA: NONREACTIVE
HDLC SERPL-MCNC: 50 MG/DL
HIV 1+2 AB+HIV1 P24 AG SERPL QL IA: NONREACTIVE
LDLC SERPL CALC-MCNC: 88 MG/DL
NONHDLC SERPL-MCNC: 104 MG/DL
POTASSIUM BLD-SCNC: 3.7 MMOL/L (ref 3.4–5.3)
PROT SERPL-MCNC: 7.2 G/DL (ref 6.8–8.8)
SODIUM SERPL-SCNC: 140 MMOL/L (ref 133–144)
TRIGL SERPL-MCNC: 82 MG/DL

## 2021-11-16 NOTE — RESULT ENCOUNTER NOTE
Derrell Mr. Joshi,  Your results came back and are within acceptable limits. -Cholesterol levels (LDL,HDL, Triglycerides) are normal.  ADVISE: rechecking in 1 year.   -Liver and gallbladder tests are normal (ALT,AST, Alk phos, bilirubin), kidney function is normal (Cr, GFR), sodium is normal, potassium is normal, calcium is normal, glucose is normal.. If you have any further concerns please do not hesitate to contact us by message, phone or making an appointment.  Have a good day   Dr Gareth VIZCARRA

## 2021-11-18 PROBLEM — M67.879 ACHILLES TENDON MASS: Status: ACTIVE | Noted: 2021-11-18

## 2021-11-18 PROBLEM — M79.18 PAIN OF RIGHT DELTOID: Status: ACTIVE | Noted: 2021-11-18

## 2021-11-26 LAB — COLOGUARD-ABSTRACT: NEGATIVE

## 2021-12-09 NOTE — RESULT ENCOUNTER NOTE
Derrell Mr. Joshi,  Your results came back and are within acceptable limits. Negative cologuard, screening for colon cancer negative. Due for recheck in 3 years. If you have any further concerns please do not hesitate to contact us by message, phone or making an appointment.  Have a good day   Dr Gareth VIZCARRA

## 2022-07-01 ENCOUNTER — IMMUNIZATION (OUTPATIENT)
Dept: NURSING | Facility: CLINIC | Age: 52
End: 2022-07-01
Payer: COMMERCIAL

## 2022-07-01 PROCEDURE — 91305 COVID-19,PF,PFIZER (12+ YRS): CPT

## 2022-07-01 PROCEDURE — 0054A COVID-19,PF,PFIZER (12+ YRS): CPT

## 2022-08-11 ENCOUNTER — LAB (OUTPATIENT)
Dept: LAB | Facility: CLINIC | Age: 52
End: 2022-08-11
Attending: FAMILY MEDICINE
Payer: COMMERCIAL

## 2022-08-11 DIAGNOSIS — Z20.822 CLOSE EXPOSURE TO 2019 NOVEL CORONAVIRUS: ICD-10-CM

## 2022-08-11 PROCEDURE — U0005 INFEC AGEN DETEC AMPLI PROBE: HCPCS

## 2022-08-11 PROCEDURE — U0003 INFECTIOUS AGENT DETECTION BY NUCLEIC ACID (DNA OR RNA); SEVERE ACUTE RESPIRATORY SYNDROME CORONAVIRUS 2 (SARS-COV-2) (CORONAVIRUS DISEASE [COVID-19]), AMPLIFIED PROBE TECHNIQUE, MAKING USE OF HIGH THROUGHPUT TECHNOLOGIES AS DESCRIBED BY CMS-2020-01-R: HCPCS

## 2022-08-12 LAB — SARS-COV-2 RNA RESP QL NAA+PROBE: POSITIVE

## 2022-10-14 ENCOUNTER — IMMUNIZATION (OUTPATIENT)
Dept: FAMILY MEDICINE | Facility: CLINIC | Age: 52
End: 2022-10-14
Payer: COMMERCIAL

## 2022-10-14 DIAGNOSIS — Z23 NEED FOR INFLUENZA VACCINATION: Primary | ICD-10-CM

## 2022-10-14 DIAGNOSIS — Z23 HIGH PRIORITY FOR COVID-19 VACCINATION: ICD-10-CM

## 2022-10-14 PROCEDURE — 90471 IMMUNIZATION ADMIN: CPT

## 2022-10-14 PROCEDURE — 99207 PR NO CHARGE NURSE ONLY: CPT

## 2022-10-14 PROCEDURE — 0124A COVID-19,PF,PFIZER BOOSTER BIVALENT: CPT

## 2022-10-14 PROCEDURE — 91312 COVID-19,PF,PFIZER BOOSTER BIVALENT: CPT

## 2022-10-14 PROCEDURE — 90682 RIV4 VACC RECOMBINANT DNA IM: CPT

## 2022-11-28 ASSESSMENT — ENCOUNTER SYMPTOMS
SORE THROAT: 0
PALPITATIONS: 0
WEAKNESS: 0
MYALGIAS: 0
HEARTBURN: 0
DIARRHEA: 0
CONSTIPATION: 0
DYSURIA: 0
DIZZINESS: 0
EYE PAIN: 0
ARTHRALGIAS: 0
FEVER: 0
SHORTNESS OF BREATH: 0
NAUSEA: 0
FREQUENCY: 0
NERVOUS/ANXIOUS: 0
ABDOMINAL PAIN: 0
HEMATURIA: 0
CHILLS: 0
JOINT SWELLING: 0
COUGH: 0
PARESTHESIAS: 0
HEADACHES: 0
HEMATOCHEZIA: 0

## 2022-12-01 ENCOUNTER — OFFICE VISIT (OUTPATIENT)
Dept: FAMILY MEDICINE | Facility: CLINIC | Age: 52
End: 2022-12-01
Payer: COMMERCIAL

## 2022-12-01 VITALS
SYSTOLIC BLOOD PRESSURE: 101 MMHG | WEIGHT: 182.2 LBS | BODY MASS INDEX: 23.38 KG/M2 | HEART RATE: 83 BPM | TEMPERATURE: 97.7 F | HEIGHT: 74 IN | OXYGEN SATURATION: 96 % | DIASTOLIC BLOOD PRESSURE: 69 MMHG

## 2022-12-01 DIAGNOSIS — Z00.00 HEALTH CARE MAINTENANCE: Primary | ICD-10-CM

## 2022-12-01 DIAGNOSIS — Z23 NEED FOR VACCINATION: ICD-10-CM

## 2022-12-01 PROCEDURE — 99396 PREV VISIT EST AGE 40-64: CPT | Mod: 25 | Performed by: FAMILY MEDICINE

## 2022-12-01 PROCEDURE — 90750 HZV VACC RECOMBINANT IM: CPT | Performed by: FAMILY MEDICINE

## 2022-12-01 PROCEDURE — 90471 IMMUNIZATION ADMIN: CPT | Performed by: FAMILY MEDICINE

## 2022-12-01 PROCEDURE — 90472 IMMUNIZATION ADMIN EACH ADD: CPT | Performed by: FAMILY MEDICINE

## 2022-12-01 PROCEDURE — 90715 TDAP VACCINE 7 YRS/> IM: CPT | Performed by: FAMILY MEDICINE

## 2022-12-01 ASSESSMENT — ENCOUNTER SYMPTOMS
FREQUENCY: 0
DIARRHEA: 0
SHORTNESS OF BREATH: 0
HEMATURIA: 0
ABDOMINAL PAIN: 0
FEVER: 0
NERVOUS/ANXIOUS: 0
EYE PAIN: 0
PARESTHESIAS: 0
SORE THROAT: 0
NAUSEA: 0
CONSTIPATION: 0
HEADACHES: 0
PALPITATIONS: 0
HEARTBURN: 0
MYALGIAS: 0
DIZZINESS: 0
CHILLS: 0
COUGH: 0
ARTHRALGIAS: 0
HEMATOCHEZIA: 0
DYSURIA: 0
WEAKNESS: 0
JOINT SWELLING: 0

## 2022-12-01 NOTE — PROGRESS NOTES
SUBJECTIVE:   CC: Yaron is an 52 year old who presents for preventative health visit.     Patient has been advised of split billing requirements and indicates understanding: Yes     Healthy Habits:     Getting at least 3 servings of Calcium per day:  Yes    Bi-annual eye exam:  Yes    Dental care twice a year:  Yes    Sleep apnea or symptoms of sleep apnea:  None    Diet:  Regular (no restrictions)    Frequency of exercise:  1 day/week    Duration of exercise:  30-45 minutes    Taking medications regularly:  Yes    Medication side effects:  Not applicable    PHQ-2 Total Score: 0    Additional concerns today:  No    Patient is doing well.  He is up-to-date on his preventive services.  Due for Tdap update and first shingles vaccine today.  Most recent lipid panel was all within normal range.  Historically this has been the case.  He has had normal PSA in the last year.    Today's PHQ-2 Score:   PHQ-2 ( 1999 Pfizer) 11/28/2022   Q1: Little interest or pleasure in doing things 0   Q2: Feeling down, depressed or hopeless 0   PHQ-2 Score 0   PHQ-2 Total Score (12-17 Years)- Positive if 3 or more points; Administer PHQ-A if positive -   Q1: Little interest or pleasure in doing things Not at all   Q2: Feeling down, depressed or hopeless Not at all   PHQ-2 Score 0           Social History     Tobacco Use     Smoking status: Never     Smokeless tobacco: Never   Substance Use Topics     Alcohol use: Yes     Alcohol/week: 0.0 standard drinks     Comment: 2-3 drinks a week     If you drink alcohol do you typically have >3 drinks per day or >7 drinks per week? No    Alcohol Use 11/28/2022   Prescreen: >3 drinks/day or >7 drinks/week? No   Prescreen: >3 drinks/day or >7 drinks/week? -   No flowsheet data found.    Last PSA:   PSA   Date Value Ref Range Status   11/11/2020 1.41 0 - 4 ug/L Final     Comment:     Assay Method:  Chemiluminescence using Siemens Vista analyzer       Reviewed orders with patient. Reviewed health  "maintenance and updated orders accordingly - Yes  Labs reviewed in EPIC    Reviewed and updated as needed this visit by clinical staff   Tobacco  Allergies  Meds              Reviewed and updated as needed this visit by Provider                 Past Medical History:   Diagnosis Date     NO ACTIVE PROBLEMS       Past Surgical History:   Procedure Laterality Date     HERNIA REPAIR, INGUINAL RT/LT        CIRCUMCISION         Review of Systems   Constitutional: Negative for chills and fever.   HENT: Negative for congestion, ear pain, hearing loss and sore throat.    Eyes: Negative for pain and visual disturbance.   Respiratory: Negative for cough and shortness of breath.    Cardiovascular: Negative for chest pain, palpitations and peripheral edema.   Gastrointestinal: Negative for abdominal pain, constipation, diarrhea, heartburn, hematochezia and nausea.   Genitourinary: Negative for dysuria, frequency, genital sores, hematuria, impotence, penile discharge and urgency.   Musculoskeletal: Negative for arthralgias, joint swelling and myalgias.   Skin: Negative for rash.   Neurological: Negative for dizziness, weakness, headaches and paresthesias.   Psychiatric/Behavioral: Negative for mood changes. The patient is not nervous/anxious.          OBJECTIVE:   /69 (BP Location: Right arm, Patient Position: Sitting, Cuff Size: Adult Large)   Pulse 83   Temp 97.7  F (36.5  C) (Tympanic)   Ht 1.867 m (6' 1.5\")   Wt 82.6 kg (182 lb 3.2 oz)   SpO2 96%   BMI 23.71 kg/m      Physical Exam  GENERAL: healthy, alert and no distress  EYES: Eyes grossly normal to inspection, PERRL and conjunctivae and sclerae normal  HENT: ear canals and TM's normal, nose and mouth without ulcers or lesions  NECK: no adenopathy, no asymmetry, masses, or scars and thyroid normal to palpation  RESP: lungs clear to auscultation - no rales, rhonchi or wheezes  CV: regular rate and rhythm, normal S1 S2, no S3 or S4, no murmur, click " or rub, no peripheral edema and peripheral pulses strong  ABDOMEN: soft, nontender, no hepatosplenomegaly, no masses and bowel sounds normal  MS: no gross musculoskeletal defects noted, no edema  SKIN: no suspicious lesions or rashes  NEURO: Normal strength and tone, mentation intact and speech normal  PSYCH: mentation appears normal, affect normal/bright    Diagnostic Test Results:  Labs reviewed in Epic    ASSESSMENT/PLAN:   Sascha was seen today for physical.    Diagnoses and all orders for this visit:    Health care maintenance    Need for vaccination  -     ZOSTER VACCINE RECOMBINANT ADJUVANTED (SHINGRIX)  -     TDAP VACCINE (Adacel, Boostrix)    Other orders  -     REVIEW OF HEALTH MAINTENANCE PROTOCOL ORDERS        Patient has been advised of split billing requirements and indicates understanding: Yes      COUNSELING:   Reviewed preventive health counseling, as reflected in patient instructions       Regular exercise       Healthy diet/nutrition       Vision screening       Hearing screening       Colorectal cancer screening       Prostate cancer screening        He reports that he has never smoked. He has never used smokeless tobacco.            Luis Carlos Randhawa MD  [facility]

## 2023-03-06 ENCOUNTER — TELEPHONE (OUTPATIENT)
Dept: FAMILY MEDICINE | Facility: CLINIC | Age: 53
End: 2023-03-06
Payer: COMMERCIAL

## 2023-03-06 DIAGNOSIS — Z23 NEED FOR SHINGLES VACCINE: Primary | ICD-10-CM

## 2023-03-06 NOTE — TELEPHONE ENCOUNTER
Patient scheduled to receive Shingrix #2 at [facility] on 03/07/23.  No order noted.  First dose received 12/01/22.    Will Provider place order if indicated?  Thank you.

## 2023-03-07 ENCOUNTER — ALLIED HEALTH/NURSE VISIT (OUTPATIENT)
Dept: FAMILY MEDICINE | Facility: CLINIC | Age: 53
End: 2023-03-07
Payer: COMMERCIAL

## 2023-03-07 DIAGNOSIS — Z23 IMMUNIZATION DUE: Primary | ICD-10-CM

## 2023-03-07 DIAGNOSIS — Z23 NEED FOR SHINGLES VACCINE: ICD-10-CM

## 2023-03-07 PROCEDURE — 90750 HZV VACC RECOMBINANT IM: CPT

## 2023-03-07 PROCEDURE — 90471 IMMUNIZATION ADMIN: CPT

## 2023-03-24 ENCOUNTER — OFFICE VISIT (OUTPATIENT)
Dept: FAMILY MEDICINE | Facility: CLINIC | Age: 53
End: 2023-03-24
Payer: COMMERCIAL

## 2023-03-24 VITALS
OXYGEN SATURATION: 98 % | SYSTOLIC BLOOD PRESSURE: 119 MMHG | BODY MASS INDEX: 22.74 KG/M2 | DIASTOLIC BLOOD PRESSURE: 74 MMHG | HEART RATE: 85 BPM | RESPIRATION RATE: 16 BRPM | TEMPERATURE: 98.8 F | WEIGHT: 174.7 LBS

## 2023-03-24 DIAGNOSIS — M54.50 ACUTE BILATERAL LOW BACK PAIN WITHOUT SCIATICA: Primary | ICD-10-CM

## 2023-03-24 PROCEDURE — 99213 OFFICE O/P EST LOW 20 MIN: CPT | Performed by: NURSE PRACTITIONER

## 2023-03-24 RX ORDER — CYCLOBENZAPRINE HCL 10 MG
10 TABLET ORAL
Qty: 20 TABLET | Refills: 0 | Status: SHIPPED | OUTPATIENT
Start: 2023-03-24 | End: 2023-10-06

## 2023-03-24 ASSESSMENT — ENCOUNTER SYMPTOMS
NUMBNESS: 0
WEAKNESS: 0

## 2023-03-24 NOTE — PATIENT INSTRUCTIONS
Advil 600 mg up to 4 times daily as needed.  With something to eat.       Try Salonpas patches - follow directions for off and on.      Heating pads as needed.

## 2023-03-24 NOTE — PROGRESS NOTES
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  Worse depending on activity.  Especially going from lying to sitting.  Has been bothersome for a couple of weeks.    No red flag features.  No radiating pain, weakness.    Recommend physical therapy for strengthening and flexibility.     Advil 600 mg up to 4 times daily as needed.      Try Salonpas patches - follow directions for off and on.      Heating pads as needed.           Return in about 2 weeks (around 4/7/2023).    Jocelyn Cox, [facility] ANG Marrufo is a 52 year old male who presents to clinic today for the following health issues:  Chief Complaint   Patient presents with     Musculoskeletal Problem     Low back sharp pain for 2 weeks     HPI    Got up from sitting about 2 weeks ago and had sudden, sharp pain in right lower back.      Denies chronic lower back.     Works in an office.      Pain currently 1/10 seated. Sometimes will be okay.  Gets difficult to walk at times, rated 3/4-10 with getting out of bed.      Feels a little soreness on the lateral aspects of thighs.  \"Doesn't feel like nerve pain.\"     Taking 200 mg advil sometimes. Tried yesterday.  Not sure if it works.           Review of Systems   Neurological: Negative for weakness and numbness.           Objective    /74   Pulse 85   Temp 98.8  F (37.1  C) (Oral)   Resp 16   Wt 79.2 kg (174 lb 11.2 oz)   SpO2 98%   BMI 22.74 kg/m    Physical Exam  Constitutional:       Appearance: He is well-developed.   HENT:      Right Ear: External ear normal.      Left Ear: External ear normal.   Eyes:      General:         Right eye: No discharge.         Left eye: No discharge.      Conjunctiva/sclera: Conjunctivae normal.   Pulmonary:      Effort: Pulmonary " effort is normal.   Musculoskeletal:         General: No tenderness (Painful across lower back.  No tenderness in indicated area of pain.  No midline spinal tenderness.). Normal range of motion.      Comments: Pain is triggered going from lying to sitting and leaning forward from a seated position.   Skin:     General: Skin is warm.   Neurological:      Mental Status: He is alert and oriented to person, place, and time.   Psychiatric:         Behavior: Behavior normal.         Thought Content: Thought content normal.         Judgment: Judgment normal.

## 2023-10-06 ENCOUNTER — TELEPHONE (OUTPATIENT)
Dept: FAMILY MEDICINE | Facility: CLINIC | Age: 53
End: 2023-10-06
Payer: COMMERCIAL

## 2023-10-06 PROBLEM — M79.18 PAIN OF RIGHT DELTOID: Status: RESOLVED | Noted: 2021-11-18 | Resolved: 2023-10-06

## 2023-10-06 NOTE — TELEPHONE ENCOUNTER
Patient called due to testing positive this morning with mild symptoms.     Patient wanting to know if he needs to reschedule annual visit that was scheduled for today.   Patient informed that he will need to reschedule via DOD.     LISSA EstradaN BELÉN  [facility]

## 2023-10-17 ASSESSMENT — ENCOUNTER SYMPTOMS
HEMATURIA: 0
PALPITATIONS: 0
DYSURIA: 0
NERVOUS/ANXIOUS: 0
ABDOMINAL PAIN: 0
HEMATOCHEZIA: 0
FEVER: 0
SHORTNESS OF BREATH: 0
MYALGIAS: 0
HEARTBURN: 0
EYE PAIN: 0
PARESTHESIAS: 0
HEADACHES: 0
SORE THROAT: 0
FREQUENCY: 0
ARTHRALGIAS: 0
NAUSEA: 0
CHILLS: 0
DIZZINESS: 0
JOINT SWELLING: 0
DIARRHEA: 0
COUGH: 0
WEAKNESS: 0
CONSTIPATION: 0

## 2023-10-24 ENCOUNTER — OFFICE VISIT (OUTPATIENT)
Dept: FAMILY MEDICINE | Facility: CLINIC | Age: 53
End: 2023-10-24
Payer: COMMERCIAL

## 2023-10-24 VITALS
HEART RATE: 60 BPM | DIASTOLIC BLOOD PRESSURE: 66 MMHG | TEMPERATURE: 97.3 F | OXYGEN SATURATION: 98 % | HEIGHT: 73 IN | RESPIRATION RATE: 11 BRPM | SYSTOLIC BLOOD PRESSURE: 110 MMHG | WEIGHT: 180.8 LBS | BODY MASS INDEX: 23.96 KG/M2

## 2023-10-24 DIAGNOSIS — Z00.00 ROUTINE GENERAL MEDICAL EXAMINATION AT A HEALTH CARE FACILITY: Primary | ICD-10-CM

## 2023-10-24 LAB
ALBUMIN SERPL BCG-MCNC: 4.5 G/DL (ref 3.5–5.2)
ALP SERPL-CCNC: 71 U/L (ref 40–129)
ALT SERPL W P-5'-P-CCNC: 13 U/L (ref 0–70)
ANION GAP SERPL CALCULATED.3IONS-SCNC: 10 MMOL/L (ref 7–15)
AST SERPL W P-5'-P-CCNC: 23 U/L (ref 0–45)
BILIRUB SERPL-MCNC: 1.2 MG/DL
BUN SERPL-MCNC: 15.1 MG/DL (ref 6–20)
CALCIUM SERPL-MCNC: 9.3 MG/DL (ref 8.6–10)
CHLORIDE SERPL-SCNC: 102 MMOL/L (ref 98–107)
CHOLEST SERPL-MCNC: 175 MG/DL
CREAT SERPL-MCNC: 0.84 MG/DL (ref 0.67–1.17)
DEPRECATED HCO3 PLAS-SCNC: 27 MMOL/L (ref 22–29)
EGFRCR SERPLBLD CKD-EPI 2021: >90 ML/MIN/1.73M2
GLUCOSE SERPL-MCNC: 87 MG/DL (ref 70–99)
HDLC SERPL-MCNC: 49 MG/DL
LDLC SERPL CALC-MCNC: 116 MG/DL
NONHDLC SERPL-MCNC: 126 MG/DL
POTASSIUM SERPL-SCNC: 4.5 MMOL/L (ref 3.4–5.3)
PROT SERPL-MCNC: 7.3 G/DL (ref 6.4–8.3)
PSA SERPL DL<=0.01 NG/ML-MCNC: 1.28 NG/ML (ref 0–3.5)
SODIUM SERPL-SCNC: 139 MMOL/L (ref 135–145)
TRIGL SERPL-MCNC: 49 MG/DL

## 2023-10-24 PROCEDURE — 80061 LIPID PANEL: CPT | Performed by: PHYSICIAN ASSISTANT

## 2023-10-24 PROCEDURE — 36415 COLL VENOUS BLD VENIPUNCTURE: CPT | Performed by: PHYSICIAN ASSISTANT

## 2023-10-24 PROCEDURE — 80053 COMPREHEN METABOLIC PANEL: CPT | Performed by: PHYSICIAN ASSISTANT

## 2023-10-24 PROCEDURE — 99396 PREV VISIT EST AGE 40-64: CPT | Mod: 25 | Performed by: PHYSICIAN ASSISTANT

## 2023-10-24 PROCEDURE — 90682 RIV4 VACC RECOMBINANT DNA IM: CPT | Performed by: PHYSICIAN ASSISTANT

## 2023-10-24 PROCEDURE — 90471 IMMUNIZATION ADMIN: CPT | Performed by: PHYSICIAN ASSISTANT

## 2023-10-24 PROCEDURE — G0103 PSA SCREENING: HCPCS | Performed by: PHYSICIAN ASSISTANT

## 2023-10-24 ASSESSMENT — ENCOUNTER SYMPTOMS
PARESTHESIAS: 0
SHORTNESS OF BREATH: 0
CONSTIPATION: 0
HEMATURIA: 0
HEADACHES: 0
SORE THROAT: 0
MYALGIAS: 0
DIARRHEA: 0
FREQUENCY: 0
FEVER: 0
DYSURIA: 0
PALPITATIONS: 0
WEAKNESS: 0
ABDOMINAL PAIN: 0
NERVOUS/ANXIOUS: 0
EYE PAIN: 0
HEARTBURN: 0
ARTHRALGIAS: 0
COUGH: 0
JOINT SWELLING: 0
DIZZINESS: 0
HEMATOCHEZIA: 0
CHILLS: 0
NAUSEA: 0

## 2023-10-24 ASSESSMENT — PAIN SCALES - GENERAL: PAINLEVEL: NO PAIN (0)

## 2023-10-24 NOTE — PROGRESS NOTES
SUBJECTIVE:   CC: Yaron is an 53 year old who presents for preventative health visit.       10/24/2023     9:42 AM   Additional Questions   Roomed by Cindy MARKS       Healthy Habits:     Getting at least 3 servings of Calcium per day:  Yes    Bi-annual eye exam:  Yes    Dental care twice a year:  Yes    Sleep apnea or symptoms of sleep apnea:  None    Diet:  Regular (no restrictions)    Frequency of exercise:  4-5 days/week    Duration of exercise:  Less than 15 minutes    Taking medications regularly:  Yes    Medication side effects:  Not applicable    Additional concerns today:  No          Cologuard UTD, repeat in 2024      Social History     Tobacco Use    Smoking status: Never    Smokeless tobacco: Never   Substance Use Topics    Alcohol use: Yes     Alcohol/week: 0.0 standard drinks of alcohol     Comment: 2-3 drinks a week             10/17/2023     3:50 PM   Alcohol Use   Prescreen: >3 drinks/day or >7 drinks/week? No          No data to display                Last PSA:   PSA   Date Value Ref Range Status   11/11/2020 1.41 0 - 4 ug/L Final     Comment:     Assay Method:  Chemiluminescence using Siemens Vista analyzer       Reviewed orders with patient. Reviewed health maintenance and updated orders accordingly - Yes  BP Readings from Last 3 Encounters:   10/24/23 110/66   03/24/23 119/74   12/01/22 101/69    Wt Readings from Last 3 Encounters:   10/24/23 82 kg (180 lb 12.8 oz)   03/24/23 79.2 kg (174 lb 11.2 oz)   12/01/22 82.6 kg (182 lb 3.2 oz)            Reviewed and updated as needed this visit by clinical staff   Tobacco  Allergies  Meds              Reviewed and updated as needed this visit by Provider                     Review of Systems   Constitutional:  Negative for chills and fever.   HENT:  Negative for congestion, ear pain, hearing loss and sore throat.    Eyes:  Negative for pain and visual disturbance.   Respiratory:  Negative for cough and shortness of breath.    Cardiovascular:  Negative for  "chest pain, palpitations and peripheral edema.   Gastrointestinal:  Negative for abdominal pain, constipation, diarrhea, heartburn, hematochezia and nausea.   Genitourinary:  Negative for dysuria, frequency, genital sores, hematuria, impotence, penile discharge and urgency.   Musculoskeletal:  Negative for arthralgias, joint swelling and myalgias.   Skin:  Negative for rash.   Neurological:  Negative for dizziness, weakness, headaches and paresthesias.   Psychiatric/Behavioral:  Negative for mood changes. The patient is not nervous/anxious.          OBJECTIVE:   /66 (BP Location: Right arm, Patient Position: Sitting, Cuff Size: Adult Regular)   Pulse 60   Temp 97.3  F (36.3  C) (Temporal)   Resp 11   Ht 1.854 m (6' 1\")   Wt 82 kg (180 lb 12.8 oz)   SpO2 98%   BMI 23.85 kg/m      Physical Exam  GENERAL: healthy, alert and no distress  EYES: Eyes grossly normal to inspection, PERRL and conjunctivae and sclerae normal  HENT: ear canals and TM's normal, nose and mouth without ulcers or lesions  NECK: no adenopathy, no asymmetry, masses, or scars and thyroid normal to palpation  RESP: lungs clear to auscultation - no rales, rhonchi or wheezes  CV: regular rate and rhythm, normal S1 S2, no S3 or S4, no murmur, click or rub, no peripheral edema and peripheral pulses strong  ABDOMEN: soft, nontender, no hepatosplenomegaly, no masses and bowel sounds normal  MS: no gross musculoskeletal defects noted, no edema  SKIN: no suspicious lesions or rashes  NEURO: Normal strength and tone, mentation intact and speech normal  PSYCH: mentation appears normal, affect normal/bright        ASSESSMENT/PLAN:   Yaron was seen today for physical.    Diagnoses and all orders for this visit:    Routine general medical examination at a health care facility  -     PSA, screen; Future  -     Lipid panel reflex to direct LDL Fasting; Future  -     Comprehensive metabolic panel (BMP + Alb, Alk Phos, ALT, AST, Total. Bili, TP); Future  -  "    PSA, screen  -     Lipid panel reflex to direct LDL Fasting  -     Comprehensive metabolic panel (BMP + Alb, Alk Phos, ALT, AST, Total. Bili, TP)    Screening labs and preventive vaccines updated today, no additional concerns.  Pt with COVID 3 weeks prior, advised COVID booster in Jan 2024    Other orders  -     PRIMARY CARE FOLLOW-UP SCHEDULING; Future  -     INFLUENZA VACCINE 18-64Y (FLUBLOK)          COUNSELING:   Reviewed preventive health counseling, as reflected in patient instructions       Regular exercise       Healthy diet/nutrition        He reports that he has never smoked. He has never used smokeless tobacco.            Liborio Sheth PA-C  [facility]

## 2024-02-08 ENCOUNTER — IMMUNIZATION (OUTPATIENT)
Dept: FAMILY MEDICINE | Facility: CLINIC | Age: 54
End: 2024-02-08
Payer: COMMERCIAL

## 2024-02-08 PROCEDURE — 91320 SARSCV2 VAC 30MCG TRS-SUC IM: CPT

## 2024-02-08 PROCEDURE — 90480 ADMN SARSCOV2 VAC 1/ONLY CMP: CPT

## 2024-04-07 ENCOUNTER — OFFICE VISIT (OUTPATIENT)
Dept: URGENT CARE | Facility: URGENT CARE | Age: 54
End: 2024-04-07
Payer: COMMERCIAL

## 2024-04-07 VITALS
DIASTOLIC BLOOD PRESSURE: 68 MMHG | TEMPERATURE: 97.3 F | HEART RATE: 85 BPM | OXYGEN SATURATION: 100 % | RESPIRATION RATE: 12 BRPM | SYSTOLIC BLOOD PRESSURE: 111 MMHG

## 2024-04-07 DIAGNOSIS — H53.8 BLURRED VISION: Primary | ICD-10-CM

## 2024-04-07 PROCEDURE — 99213 OFFICE O/P EST LOW 20 MIN: CPT | Performed by: EMERGENCY MEDICINE

## 2024-04-07 NOTE — PROGRESS NOTES
Assessment & Plan     Diagnosis:    ICD-10-CM    1. Blurred vision  H53.8 Adult Eye  Referral    right eye          Medical Decision Making:  Sascha Joshi is a 53 year old male who presents for evaluation of a red eye.  A broad differential diagnosis was considered including bacterial conjunctivitis, viral conjunctivitis, foreign body, corneal abrasion, chemical vs allergic conjunctivitis, corneal ulcer, HSV, herpes zoster opthalmicus, endopthalmitis, orbital cellulitis, etc. no obvious retinopathy, cells or flare on fundoscope exam.  Visual acuity is grossly intact, unclear if this is just dry eyes or developing conjunctivitis.  Will have him follow-up with ophthalmology as he is due for vision testing anyway. Patient verbalizes understanding and agreement with the plan including reasons to go to the ER. All questions answered.     Niels Salazar PA-C  [facility]    Subjective     Sascha Joshi is a 53 year old male who presents to clinic today for the following health issues:  Chief Complaint   Patient presents with    Eye Problem     Right eye concern, patient states it seems like he's looking through a film, started a few days ago, mild drainage, no pain       HPI  Patient states for the past week he has had a right eye blurred vision intermittently, states he feels that he is seeing through film at times.  Very minimal clear drainage at times, but no crusting or mattering in the morning.  States his vision is normal now, at times has some black dots, but these never persist or progress.  Denies any pain, foreign body sensation, headaches, central vision loss, curtain being drawn feeling to the eye or other concerns.  Has not had his eyes checked by the optometrist for some time and feels like he may need new glasses.      Review of Systems    See HPI    Objective      Vitals: /68   Pulse 85   Temp 97.3  F (36.3  C) (Temporal)   Resp 12   SpO2 100%       Patient Vitals  for the past 24 hrs:   BP Temp Temp src Pulse Resp SpO2   04/07/24 1303 111/68 97.3  F (36.3  C) Temporal 85 12 100 %       Vital signs reviewed by: Niels Salazar PA-C    Physical Exam   Constitutional: Patient is alert and cooperative. No acute distress.  Mouth: Mucous membranes are moist. Normal tongue and tonsil. Posterior oropharynx is clear.  Eyes: Conjunctivae, EOMI and lids are normal. PERRL.  Right eye with no gross retinopathy.  Visual acuity intact.  Cardiovascular: Regular rate and rhythm  Pulmonary/Chest: Lungs are clear to auscultation throughout. Effort normal. No respiratory distress. No wheezes, rales or rhonchi.  Neurological: Alert and oriented x3. CN 3-7 and 9-12 intact.  Visual acuity intact.  Skin: No rash noted on visualized skin.  Psychiatric:The patient has a normal mood and affect.       Niels Salazar PA-C, April 7, 2024

## 2024-04-07 NOTE — PATIENT INSTRUCTIONS
Use artificial tears to make sure the eye is well lubricated for the time being. ER with worsening or persistent vision changes. Otherwise follow up with the eye doctor per the referral.

## 2024-04-07 NOTE — NURSING NOTE
VISION   Wears glasses: worn for testing  Tool used: Flores   Right eye:        10/12.5 (20/25)  Left eye:          10/10 (20/20)  Visual Acuity: Pass    Adri Shoemaker, CMA

## 2024-04-08 ENCOUNTER — OFFICE VISIT (OUTPATIENT)
Dept: OPHTHALMOLOGY | Facility: CLINIC | Age: 54
End: 2024-04-08
Attending: STUDENT IN AN ORGANIZED HEALTH CARE EDUCATION/TRAINING PROGRAM
Payer: COMMERCIAL

## 2024-04-08 ENCOUNTER — NURSE TRIAGE (OUTPATIENT)
Dept: NURSING | Facility: CLINIC | Age: 54
End: 2024-04-08

## 2024-04-08 DIAGNOSIS — H43.811 POSTERIOR VITREOUS DETACHMENT OF RIGHT EYE: Primary | ICD-10-CM

## 2024-04-08 DIAGNOSIS — H33.311 RETINAL TEAR OF RIGHT EYE: ICD-10-CM

## 2024-04-08 PROCEDURE — 92134 CPTRZ OPH DX IMG PST SGM RTA: CPT | Performed by: OPHTHALMOLOGY

## 2024-04-08 PROCEDURE — 67145 PROPH RTA DTCHMNT PC: CPT | Mod: RT | Performed by: OPHTHALMOLOGY

## 2024-04-08 PROCEDURE — 92134 CPTRZ OPH DX IMG PST SGM RTA: CPT | Mod: 26 | Performed by: OPHTHALMOLOGY

## 2024-04-08 PROCEDURE — 99213 OFFICE O/P EST LOW 20 MIN: CPT | Mod: 25 | Performed by: OPHTHALMOLOGY

## 2024-04-08 PROCEDURE — 99204 OFFICE O/P NEW MOD 45 MIN: CPT | Mod: 25 | Performed by: OPHTHALMOLOGY

## 2024-04-08 ASSESSMENT — CONF VISUAL FIELD
OS_SUPERIOR_NASAL_RESTRICTION: 0
METHOD: COUNTING FINGERS
OD_INFERIOR_NASAL_RESTRICTION: 0
OD_INFERIOR_TEMPORAL_RESTRICTION: 0
OS_INFERIOR_NASAL_RESTRICTION: 0
OS_INFERIOR_TEMPORAL_RESTRICTION: 0
OS_NORMAL: 1
OD_NORMAL: 1
OS_SUPERIOR_TEMPORAL_RESTRICTION: 0
OD_SUPERIOR_TEMPORAL_RESTRICTION: 0
OD_SUPERIOR_NASAL_RESTRICTION: 0

## 2024-04-08 ASSESSMENT — REFRACTION_WEARINGRX
SPECS_TYPE: PAL
OD_SPHERE: -1.50
OS_AXIS: 019
OD_CYLINDER: +0.50
OS_ADD: +1.75
OD_AXIS: 050
OS_CYLINDER: +0.50
OS_SPHERE: -1.00
OD_ADD: +1.75

## 2024-04-08 ASSESSMENT — VISUAL ACUITY
OS_CC: 20/20
OD_CC: 20/25
OS_CC+: +2
METHOD: SNELLEN - LINEAR
OD_CC+: -2
CORRECTION_TYPE: GLASSES

## 2024-04-08 ASSESSMENT — CUP TO DISC RATIO
OS_RATIO: 0.4
OD_RATIO: 0.4

## 2024-04-08 ASSESSMENT — TONOMETRY
OD_IOP_MMHG: 15
OS_IOP_MMHG: 19
IOP_METHOD: ICARE

## 2024-04-08 ASSESSMENT — EXTERNAL EXAM - LEFT EYE: OS_EXAM: NORMAL

## 2024-04-08 ASSESSMENT — SLIT LAMP EXAM - LIDS
COMMENTS: NORMAL
COMMENTS: NORMAL

## 2024-04-08 ASSESSMENT — EXTERNAL EXAM - RIGHT EYE: OD_EXAM: NORMAL

## 2024-04-08 NOTE — NURSING NOTE
Chief Complaints and History of Present Illnesses   Patient presents with    Floaters Right Eye     New Pt with new floaters.     Chief Complaint(s) and History of Present Illness(es)       Floaters Right Eye              Associated symptoms: Negative for flashes, shade, blurred vision, headache, nausea, vomiting and photophobia    Pain scale: 0/10    Comments: New Pt with new floaters.              Comments    Pt states he hasn't really had floaters before.  New floaters RE starting around March 30 2024.  No trauma.  Pt sates the surface of his RE does not feel normal.  Has a film over vision that comes and goes.  Spot in vision moves around and comes and goes that makes the vision blurry.  No pain.  No flashes.  Treated with AT's, they gave some relief but didn't help the floater.  No DM.    KEVEN Mckenzie April 8, 2024 2:39 PM

## 2024-04-08 NOTE — TELEPHONE ENCOUNTER
Nurse Triage SBAR    Is this a 2nd Level Triage?   Yes    Situation: Change in vision in the right eye/Wanting an office visit    Background/Assessment:     Pt reporting, changes of vision in the right eye.    Sometimes the Pt will see black spots in part of his vision in the right eye.  It comes and goes and not there all the time.   Pt sees floaters in the right eye.  No flashes of light or headaches noted.  Pt wears glasses.     Pt requesting an office visit for the change of vision in his right eye.    Please call the Pt back @ [phone] for further assistance.    Karuna Kirk RN  Central Triage Red Flags/Med Refills        Protocol Recommended Disposition:   See in Office Today        Reason for Disposition   Patient wants to be seen    Additional Information   Negative: Weakness of the face, arm or leg on one side of the body   Negative: Followed getting substance in the eye   Negative: Foreign body stuck in the eye   Negative: Followed an eye injury   Negative: Followed sun lamp or sun exposure (UV keratitis)   Negative: Yellow or green discharge (pus) in the eye   Negative: Pregnant   Negative: Complete loss of vision in one or both eyes   Negative: SEVERE eye pain   Negative: SEVERE headache   Negative: Double vision   Negative: Blurred vision or visual changes and present now and sudden onset or new (e.g., minutes, hours, days)  (Exception: Seeing floaters / black specks OR previously diagnosed migraine headaches with same symptoms.)   Negative: Patient sounds very sick or weak to the triager   Negative: Flashes of light  (Exception: Brief from pressing on the eyeball.)   Negative: Many floaters in the eye (Exception: Floater(s) are a chronic symptom and this is unchanged from patient's baseline pattern.)   Negative: Eye pain and brief (now gone) blurred vision or visual changes   Negative: Taking digoxin (e.g., Lanoxin, Digitek, Cardoxin, Lanoxicaps; Toloxin in [place]) and blurred vision, yellow  vision, or yellow-green halos   Negative: Jaw pain while eating and age > 50 years   Negative: Headache and age > 50 years    Protocols used: Vision Loss or Change-A-OH

## 2024-04-08 NOTE — PROGRESS NOTES
Ophthalmology Acute Clinic     Chief Complaint(s) and History of Present Illness(es)       Floaters Right Eye    Associated symptoms include Negative for flashes, shade, blurred vision, headache, nausea, vomiting and photophobia.  Pain was noted as 0/10. Additional comments: New Pt with new floaters.             Comments    Pt states he hasn't really had floaters before.  New floaters RE starting around March 30 2024.  No trauma.  Pt sates the surface of his RE does not feel normal.  Has a film over vision that comes and goes.  Spot in vision moves around and comes and goes that makes the vision blurry.  No pain.  No flashes.  Treated with AT's, they gave some relief but didn't help the floater.  No DM.    KEVEN Mckenzie April 8, 2024 2:39 PM                         HPI:   Sascha Joshi is a 53 year old male who presents for evaluation of new floaters. Right eye developed a few new floaters 3/30/24, no flashes no dark curtain. No trauma.     Past Ocular history:   - Glasses:Yes   - Ocular Surgical History: None    PMH: Noncontributory     Review of systems for the eyes was negative other than the pertinent positives/negatives listed in the HPI.      Imaging:   OCT Macula   Right eye Posterior hyaloid attached temporally   Left eye Posterior hyaloid still attached    Assessment & Plan      Sascha Joshi is a 53 year old male with the following diagnoses:   1. Posterior vitreous detachment of right eye    2. Retinal tear of right eye     # Horseshoe tear, right eye: 53 year old male found to have a horseshoe tear at 1 near ora right eye. R/B/A to laser retinopexy discussed and patient elected to proceed. See procedure note for details. Discussed return precautions including new floaters, flashes or curtain over the vision.   - Follow up 1 week.       Patient seen and discussed with Dr. Beverly Guajardo MD  Resident Physician, PGY-2  Department of Ophthalmology

## 2024-04-09 NOTE — PROGRESS NOTES
CC -   HST OD    INTERVAL HISTORY - Initial visit with [facility] -   Sascha ROSANNA Joshi is a  53 year old year-old patient with history of HST OD Dx 4/8/24, symptoms since 3/30/24, no trauma      PAST OCULAR SURGERY  None    RETINAL IMAGING:  OCT 04/08/24   OD - macula retina normal, PVD +vit opacities  OS - macula retina normal, PHF attached      ASSESSMENT & PLAN    # HST OD   - Dx 4/8/24, symptoms 3/30/24   - advise pexy today   - r/b/a d/w patient: failure to prevent RD, vision loss, resident/fellow performance      # PVD OD   - symptoms 3/30/24   - + mild VH from HST      # syneresis OS   - advised S/Sx RD 4/8/24      # tr NS OU          return to clinic: 1 week, DFE OD    ATTESTATION     Attending Attestation:     Complete documentation of historical and exam elements from today's encounter can be found in the full encounter summary report (not reduplicated in this progress note).  I personally obtained the chief complaint(s) and history of present illness.  I confirmed and edited as necessary the review of systems, past medical/surgical history, family history, social history, and examination findings as documented by others; and I examined the patient myself.  I personally reviewed the relevant tests, images, and reports as documented above.  I personally reviewed the ophthalmic test(s) associated with this encounter, agree with the interpretation(s) as documented by the resident/fellow, and have edited the corresponding report(s) as necessary.   I formulated and edited as necessary the assessment and plan and discussed the findings and management plan with the patient and family and I was present for critical portions of the procedure carried out by the resident/fellow and immediately available for the entire procedure    Gi Paul MD, PhD  , Vitreoretinal Surgery  Department of Ophthalmology  [facility]

## 2024-04-16 ENCOUNTER — OFFICE VISIT (OUTPATIENT)
Dept: OPHTHALMOLOGY | Facility: CLINIC | Age: 54
End: 2024-04-16
Attending: OPHTHALMOLOGY
Payer: COMMERCIAL

## 2024-04-16 DIAGNOSIS — Z98.890 POST-OPERATIVE STATE: Primary | ICD-10-CM

## 2024-04-16 PROCEDURE — 99213 OFFICE O/P EST LOW 20 MIN: CPT | Performed by: OPHTHALMOLOGY

## 2024-04-16 PROCEDURE — 99024 POSTOP FOLLOW-UP VISIT: CPT | Performed by: OPHTHALMOLOGY

## 2024-04-16 ASSESSMENT — REFRACTION_WEARINGRX
OS_CYLINDER: +0.50
SPECS_TYPE: PAL
OD_ADD: +1.75
OD_AXIS: 050
OD_SPHERE: -1.50
OS_AXIS: 019
OS_ADD: +1.75
OS_SPHERE: -1.00
OD_CYLINDER: +0.50

## 2024-04-16 ASSESSMENT — TONOMETRY
IOP_METHOD: TONOPEN
OS_IOP_MMHG: 19
OD_IOP_MMHG: 16

## 2024-04-16 ASSESSMENT — SLIT LAMP EXAM - LIDS: COMMENTS: NORMAL

## 2024-04-16 ASSESSMENT — VISUAL ACUITY
CORRECTION_TYPE: GLASSES
OD_CC+: -1
OS_CC: 20/20
OS_CC+: -1
OD_CC: 20/20
METHOD: SNELLEN - LINEAR

## 2024-04-16 ASSESSMENT — EXTERNAL EXAM - RIGHT EYE: OD_EXAM: NORMAL

## 2024-04-16 ASSESSMENT — EXTERNAL EXAM - LEFT EYE: OS_EXAM: NORMAL

## 2024-04-16 ASSESSMENT — CUP TO DISC RATIO: OD_RATIO: 0.4

## 2024-04-16 NOTE — PROGRESS NOTES
CC -   HST OD    INTERVAL HISTORY - No new F/F    PMH -   Sascha Joshi is a  53 year old year-old patient with history of HST OD Dx 4/8/24, symptoms since 3/30/24, no trauma      PAST OCULAR SURGERY  Pexy OD 4/8/24 (DDK)      RETINAL IMAGING:  OCT 04/08/24   OD - macula retina normal, PVD +vit opacities  OS - macula retina normal, PHF attached      ASSESSMENT & PLAN    # HST OD   - Dx 4/8/24, symptoms 3/30/24   - s/p pexy 4/8/24   - well contained        # PVD OD   - symptoms 3/30/24   - + mild VH from HST      # syneresis OS   - advised S/Sx RD 4/8/24      # tr NS OU              ATTESTATION     Attending Attestation:     Complete documentation of historical and exam elements from today's encounter can be found in the full encounter summary report (not reduplicated in this progress note).  I personally obtained the chief complaint(s) and history of present illness.  I confirmed and edited as necessary the review of systems, past medical/surgical history, family history, social history, and examination findings as documented by others; and I examined the patient myself.  I personally reviewed the relevant tests, images, and reports as documented above.  I formulated and edited as necessary the assessment and plan and discussed the findings and management plan with the patient and family    Gi Paul MD, PhD  , Vitreoretinal Surgery  Department of Ophthalmology  [facility]

## 2024-04-16 NOTE — NURSING NOTE
Chief Complaints and History of Present Illnesses   Patient presents with    Follow Up     Follow up Horseshoe tear right eye and Laser retinopexy on 04/08/2024.     Chief Complaint(s) and History of Present Illness(es)       Follow Up              Laterality: right eye    Associated symptoms: Negative for flashes and floaters    Treatments tried: no treatments    Pain scale: 0/10    Comments: Follow up Horseshoe tear right eye and Laser retinopexy on 04/08/2024.              Comments    The patient notes he still has smudge that he notices when he has to focus on an object.  Rose Mace, COA, COA 9:16 AM 04/16/2024

## 2024-04-23 DIAGNOSIS — H43.811 PVD (POSTERIOR VITREOUS DETACHMENT), RIGHT: Primary | ICD-10-CM

## 2024-05-07 ENCOUNTER — OFFICE VISIT (OUTPATIENT)
Dept: OPHTHALMOLOGY | Facility: CLINIC | Age: 54
End: 2024-05-07
Attending: OPHTHALMOLOGY
Payer: COMMERCIAL

## 2024-05-07 DIAGNOSIS — H43.811 PVD (POSTERIOR VITREOUS DETACHMENT), RIGHT: ICD-10-CM

## 2024-05-07 PROCEDURE — 99207 OCT RETINA SPECTRALIS OU (BOTH EYE): CPT | Mod: 26 | Performed by: OPHTHALMOLOGY

## 2024-05-07 PROCEDURE — 99024 POSTOP FOLLOW-UP VISIT: CPT | Performed by: OPHTHALMOLOGY

## 2024-05-07 PROCEDURE — 92134 CPTRZ OPH DX IMG PST SGM RTA: CPT | Performed by: OPHTHALMOLOGY

## 2024-05-07 PROCEDURE — 99213 OFFICE O/P EST LOW 20 MIN: CPT | Performed by: OPHTHALMOLOGY

## 2024-05-07 ASSESSMENT — CONF VISUAL FIELD
OD_INFERIOR_NASAL_RESTRICTION: 0
OS_NORMAL: 1
OS_SUPERIOR_TEMPORAL_RESTRICTION: 0
OD_SUPERIOR_TEMPORAL_RESTRICTION: 0
OD_NORMAL: 1
METHOD: COUNTING FINGERS
OS_SUPERIOR_NASAL_RESTRICTION: 0
OS_INFERIOR_TEMPORAL_RESTRICTION: 0
OD_SUPERIOR_NASAL_RESTRICTION: 0
OS_INFERIOR_NASAL_RESTRICTION: 0
OD_INFERIOR_TEMPORAL_RESTRICTION: 0

## 2024-05-07 ASSESSMENT — VISUAL ACUITY
METHOD: SNELLEN - LINEAR
OS_CC+: +1
OD_CC: 20/20
OD_CC+: -3
OS_CC: 20/25

## 2024-05-07 ASSESSMENT — REFRACTION_WEARINGRX
OS_SPHERE: -1.00
OS_AXIS: 019
OS_CYLINDER: +0.50
OD_SPHERE: -1.50
OD_ADD: +1.75
OD_CYLINDER: +0.50
SPECS_TYPE: PAL
OD_AXIS: 050
OS_ADD: +1.75

## 2024-05-07 ASSESSMENT — TONOMETRY
OS_IOP_MMHG: 21
OD_IOP_MMHG: 16
IOP_METHOD: TONOPEN

## 2024-05-07 ASSESSMENT — EXTERNAL EXAM - LEFT EYE: OS_EXAM: NORMAL

## 2024-05-07 ASSESSMENT — SLIT LAMP EXAM - LIDS: COMMENTS: NORMAL

## 2024-05-07 ASSESSMENT — EXTERNAL EXAM - RIGHT EYE: OD_EXAM: NORMAL

## 2024-05-07 ASSESSMENT — CUP TO DISC RATIO: OD_RATIO: 0.4

## 2024-05-07 NOTE — NURSING NOTE
Chief Complaints and History of Present Illnesses   Patient presents with    Posterior Vitreous Detachment Follow Up     Chief Complaint(s) and History of Present Illness(es)       Posterior Vitreous Detachment Follow Up              Laterality: right eye              Comments    Yaron is here to follow up on right eye posterior detachment. He states he sees some floaters in right eye but not worse than before.    Delonte Ryan COT 2:42 PM May 7, 2024

## 2024-07-10 DIAGNOSIS — H43.811 PVD (POSTERIOR VITREOUS DETACHMENT), RIGHT: Primary | ICD-10-CM

## 2024-07-23 ENCOUNTER — OFFICE VISIT (OUTPATIENT)
Dept: OPHTHALMOLOGY | Facility: CLINIC | Age: 54
End: 2024-07-23
Attending: OPHTHALMOLOGY
Payer: COMMERCIAL

## 2024-07-23 DIAGNOSIS — H43.811 PVD (POSTERIOR VITREOUS DETACHMENT), RIGHT: ICD-10-CM

## 2024-07-23 PROCEDURE — 92134 CPTRZ OPH DX IMG PST SGM RTA: CPT | Performed by: OPHTHALMOLOGY

## 2024-07-23 PROCEDURE — 99213 OFFICE O/P EST LOW 20 MIN: CPT | Performed by: OPHTHALMOLOGY

## 2024-07-23 ASSESSMENT — REFRACTION_WEARINGRX
OS_ADD: +1.75
OD_AXIS: 050
OS_AXIS: 019
OD_SPHERE: -1.50
SPECS_TYPE: PAL
OS_CYLINDER: +0.50
OS_SPHERE: -1.00
OD_CYLINDER: +0.50
OD_ADD: +1.75

## 2024-07-23 ASSESSMENT — EXTERNAL EXAM - LEFT EYE: OS_EXAM: NORMAL

## 2024-07-23 ASSESSMENT — VISUAL ACUITY
OS_CC: 20/20
METHOD: SNELLEN - LINEAR
CORRECTION_TYPE: GLASSES
OD_CC: 20/25
OD_CC+: +2
OS_CC+: -1

## 2024-07-23 ASSESSMENT — EXTERNAL EXAM - RIGHT EYE: OD_EXAM: NORMAL

## 2024-07-23 ASSESSMENT — TONOMETRY
OD_IOP_MMHG: 11
OS_IOP_MMHG: 10
IOP_METHOD: TONOPEN

## 2024-07-23 ASSESSMENT — CUP TO DISC RATIO: OD_RATIO: 0.4

## 2024-07-23 ASSESSMENT — SLIT LAMP EXAM - LIDS: COMMENTS: NORMAL

## 2024-07-23 NOTE — NURSING NOTE
Chief Complaints and History of Present Illnesses   Patient presents with    Follow Up     2 month follow up HST right eye      Chief Complaint(s) and History of Present Illness(es)       Follow Up              Associated symptoms: floaters (stable).  Negative for eye pain and flashes    Treatments tried: no treatments    Pain scale: 0/10    Comments: 2 month follow up HST right eye               Comments    Pt states no vision changes since last visit.  No eye pain or discomfort.   No new curtains, flashes or floaters.   No new concerns.     Carlos Nguyen 2:44 PM July 23, 2024

## 2024-07-23 NOTE — PROGRESS NOTES
CC -   HST OD    INTERVAL HISTORY - LV 5/7/2024, here to f/u pexy OD 4/2024, No new flashes/floaters.     PMH -   Sascha Joshi is a  54 year old year-old patient with history of HST OD Dx 4/8/24, symptoms since 3/30/24, no trauma      PAST OCULAR SURGERY  Pexy OD 4/8/24 (DDK)      RETINAL IMAGING:  OCT 07/23/24    OD - macula retina normal, PVD +vit opacities  OS - macula retina normal, PHF attached peripherally      ASSESSMENT & PLAN    # HST OD   - Dx 4/8/24, symptoms 3/30/24   - s/p pexy 4/8/24   - well contained     - recheck 6 months   - if stable then with comprehensive        # PVD OD   - symptoms 3/30/24   - + mild VH from HST      # syneresis OS   - advised S/Sx RD 4/8/24      # tr NS OU        Return in about 6 months (around 1/23/2025) for DFE OU, OCT OU.       ATTESTATION     Attending Attestation:     Complete documentation of historical and exam elements from today's encounter can be found in the full encounter summary report (not reduplicated in this progress note).  I personally obtained the chief complaint(s) and history of present illness.  I confirmed and edited as necessary the review of systems, past medical/surgical history, family history, social history, and examination findings as documented by others; and I examined the patient myself.  I personally reviewed the relevant tests, images, and reports as documented above.  I formulated and edited as necessary the assessment and plan and discussed the findings and management plan with the patient and family    Gi Paul MD, PhD  , Vitreoretinal Surgery  Department of Ophthalmology  [facility]

## 2024-09-25 ENCOUNTER — IMMUNIZATION (OUTPATIENT)
Dept: FAMILY MEDICINE | Facility: CLINIC | Age: 54
End: 2024-09-25
Payer: COMMERCIAL

## 2024-09-25 PROCEDURE — 90656 IIV3 VACC NO PRSV 0.5 ML IM: CPT

## 2024-09-25 PROCEDURE — 90471 IMMUNIZATION ADMIN: CPT

## 2024-09-25 PROCEDURE — 91320 SARSCV2 VAC 30MCG TRS-SUC IM: CPT

## 2024-09-25 PROCEDURE — 90480 ADMN SARSCOV2 VAC 1/ONLY CMP: CPT

## 2024-10-24 SDOH — HEALTH STABILITY: PHYSICAL HEALTH: ON AVERAGE, HOW MANY DAYS PER WEEK DO YOU ENGAGE IN MODERATE TO STRENUOUS EXERCISE (LIKE A BRISK WALK)?: 5 DAYS

## 2024-10-24 SDOH — HEALTH STABILITY: PHYSICAL HEALTH: ON AVERAGE, HOW MANY MINUTES DO YOU ENGAGE IN EXERCISE AT THIS LEVEL?: 10 MIN

## 2024-10-24 ASSESSMENT — SOCIAL DETERMINANTS OF HEALTH (SDOH): HOW OFTEN DO YOU GET TOGETHER WITH FRIENDS OR RELATIVES?: ONCE A WEEK

## 2024-10-25 ENCOUNTER — OFFICE VISIT (OUTPATIENT)
Dept: FAMILY MEDICINE | Facility: CLINIC | Age: 54
End: 2024-10-25
Attending: PHYSICIAN ASSISTANT
Payer: COMMERCIAL

## 2024-10-25 ENCOUNTER — ORDERS ONLY (AUTO-RELEASED) (OUTPATIENT)
Dept: FAMILY MEDICINE | Facility: CLINIC | Age: 54
End: 2024-10-25

## 2024-10-25 VITALS
DIASTOLIC BLOOD PRESSURE: 71 MMHG | TEMPERATURE: 97.2 F | RESPIRATION RATE: 18 BRPM | HEART RATE: 62 BPM | OXYGEN SATURATION: 99 % | HEIGHT: 73 IN | BODY MASS INDEX: 24.08 KG/M2 | SYSTOLIC BLOOD PRESSURE: 115 MMHG | WEIGHT: 181.7 LBS

## 2024-10-25 DIAGNOSIS — Z13.0 SCREENING, ANEMIA, DEFICIENCY, IRON: ICD-10-CM

## 2024-10-25 DIAGNOSIS — Z12.11 COLON CANCER SCREENING: ICD-10-CM

## 2024-10-25 DIAGNOSIS — Z13.29 SCREENING FOR THYROID DISORDER: ICD-10-CM

## 2024-10-25 DIAGNOSIS — Z13.1 SCREENING FOR DIABETES MELLITUS: ICD-10-CM

## 2024-10-25 DIAGNOSIS — D23.70 DERMATOFIBROMA OF LOWER EXTREMITY, UNSPECIFIED LATERALITY: ICD-10-CM

## 2024-10-25 DIAGNOSIS — Z13.6 ENCOUNTER FOR LIPID SCREENING FOR CARDIOVASCULAR DISEASE: ICD-10-CM

## 2024-10-25 DIAGNOSIS — Z71.89 ADVANCED DIRECTIVES, COUNSELING/DISCUSSION: ICD-10-CM

## 2024-10-25 DIAGNOSIS — Z00.00 HEALTH CARE MAINTENANCE: ICD-10-CM

## 2024-10-25 DIAGNOSIS — Z13.220 ENCOUNTER FOR LIPID SCREENING FOR CARDIOVASCULAR DISEASE: ICD-10-CM

## 2024-10-25 DIAGNOSIS — Z00.00 ROUTINE HISTORY AND PHYSICAL EXAMINATION OF ADULT: Primary | ICD-10-CM

## 2024-10-25 DIAGNOSIS — Z00.00 ROUTINE HISTORY AND PHYSICAL EXAMINATION OF ADULT: ICD-10-CM

## 2024-10-25 DIAGNOSIS — M67.879 ACHILLES TENDON MASS: ICD-10-CM

## 2024-10-25 LAB
ALBUMIN SERPL BCG-MCNC: 4.4 G/DL (ref 3.5–5.2)
ALP SERPL-CCNC: 83 U/L (ref 40–150)
ALT SERPL W P-5'-P-CCNC: 24 U/L (ref 0–70)
ANION GAP SERPL CALCULATED.3IONS-SCNC: 8 MMOL/L (ref 7–15)
AST SERPL W P-5'-P-CCNC: 28 U/L (ref 0–45)
BASOPHILS # BLD AUTO: 0.1 10E3/UL (ref 0–0.2)
BASOPHILS NFR BLD AUTO: 1 %
BILIRUB SERPL-MCNC: 1.1 MG/DL
BUN SERPL-MCNC: 14.5 MG/DL (ref 6–20)
CALCIUM SERPL-MCNC: 9.1 MG/DL (ref 8.8–10.4)
CHLORIDE SERPL-SCNC: 102 MMOL/L (ref 98–107)
CHOLEST SERPL-MCNC: 185 MG/DL
CREAT SERPL-MCNC: 0.87 MG/DL (ref 0.67–1.17)
EGFRCR SERPLBLD CKD-EPI 2021: >90 ML/MIN/1.73M2
EOSINOPHIL # BLD AUTO: 0.2 10E3/UL (ref 0–0.7)
EOSINOPHIL NFR BLD AUTO: 2 %
ERYTHROCYTE [DISTWIDTH] IN BLOOD BY AUTOMATED COUNT: 12.5 % (ref 10–15)
EST. AVERAGE GLUCOSE BLD GHB EST-MCNC: 94 MG/DL
FASTING STATUS PATIENT QL REPORTED: YES
FASTING STATUS PATIENT QL REPORTED: YES
GLUCOSE SERPL-MCNC: 92 MG/DL (ref 70–99)
HBA1C MFR BLD: 4.9 % (ref 0–5.6)
HCO3 SERPL-SCNC: 25 MMOL/L (ref 22–29)
HCT VFR BLD AUTO: 44.9 % (ref 40–53)
HDLC SERPL-MCNC: 59 MG/DL
HGB BLD-MCNC: 14.7 G/DL (ref 13.3–17.7)
IMM GRANULOCYTES # BLD: 0 10E3/UL
IMM GRANULOCYTES NFR BLD: 0 %
LDLC SERPL CALC-MCNC: 115 MG/DL
LYMPHOCYTES # BLD AUTO: 1.6 10E3/UL (ref 0.8–5.3)
LYMPHOCYTES NFR BLD AUTO: 26 %
MCH RBC QN AUTO: 30.8 PG (ref 26.5–33)
MCHC RBC AUTO-ENTMCNC: 32.7 G/DL (ref 31.5–36.5)
MCV RBC AUTO: 94 FL (ref 78–100)
MONOCYTES # BLD AUTO: 0.6 10E3/UL (ref 0–1.3)
MONOCYTES NFR BLD AUTO: 9 %
NEUTROPHILS # BLD AUTO: 3.9 10E3/UL (ref 1.6–8.3)
NEUTROPHILS NFR BLD AUTO: 62 %
NONHDLC SERPL-MCNC: 126 MG/DL
PLATELET # BLD AUTO: 200 10E3/UL (ref 150–450)
POTASSIUM SERPL-SCNC: 4 MMOL/L (ref 3.4–5.3)
PROT SERPL-MCNC: 7.4 G/DL (ref 6.4–8.3)
RBC # BLD AUTO: 4.77 10E6/UL (ref 4.4–5.9)
SODIUM SERPL-SCNC: 135 MMOL/L (ref 135–145)
TRIGL SERPL-MCNC: 56 MG/DL
TSH SERPL DL<=0.005 MIU/L-ACNC: 1.67 UIU/ML (ref 0.3–4.2)
WBC # BLD AUTO: 6.3 10E3/UL (ref 4–11)

## 2024-10-25 PROCEDURE — 99396 PREV VISIT EST AGE 40-64: CPT | Performed by: FAMILY MEDICINE

## 2024-10-25 PROCEDURE — 36415 COLL VENOUS BLD VENIPUNCTURE: CPT | Performed by: FAMILY MEDICINE

## 2024-10-25 PROCEDURE — 80053 COMPREHEN METABOLIC PANEL: CPT | Performed by: FAMILY MEDICINE

## 2024-10-25 PROCEDURE — 80061 LIPID PANEL: CPT | Performed by: FAMILY MEDICINE

## 2024-10-25 PROCEDURE — 84443 ASSAY THYROID STIM HORMONE: CPT | Performed by: FAMILY MEDICINE

## 2024-10-25 PROCEDURE — 85025 COMPLETE CBC W/AUTO DIFF WBC: CPT | Performed by: FAMILY MEDICINE

## 2024-10-25 PROCEDURE — 83036 HEMOGLOBIN GLYCOSYLATED A1C: CPT | Performed by: FAMILY MEDICINE

## 2024-10-25 ASSESSMENT — PAIN SCALES - GENERAL: PAINLEVEL_OUTOF10: NO PAIN (0)

## 2024-10-25 NOTE — PROGRESS NOTES
The below note was dictated using voice recognition. Although reviewed after completion, some word and grammatical error may remain .       Preventive Care Visit  [facility]  Chantel Servin MD, Family Medicine  Oct 25, 2024      Assessment & Plan     Routine history and physical examination of adult  Seen today for a physical,   Health care maintenance reviewed  Has advance directives at home, wife POA  Due for colon cancer screening. Options discussed. Cologuard neg 2021, will redo cologuard, no risk factors  No  concerns, opted out of exam does self testicular checks regularly   Vaccines reviewed and up-to-date.  Had flu and COVID-vaccine 9/28/2024.  Is fasting for lab work today and will make further recommendations once reviewed.  Discussed age risk benefits [place] task force testing for PSA given usually started age 56 was normal last year no symptoms no family history of prostate cancer risk factors mutual decision made not to check today    After the visit some labs came back showing normal red blood cell (Hgb) levels, normal white blood cell count and normal platelet levels. & Normal HBA1C    Left achilles tendon mass, likely scarring from a prior injury is mostly asymptomatic.  Does stretches as needed and is not affecting walking.    Left lower leg dermatofibroma unchanged.  Dad has had some skin stuff done.  No skin changes noted opted out of a Derm referral for now    Return in 1 year for preventive physical and sooner in an office visit for any new concerns  - COLOGUARD(EXACT SCIENCES); Future  - CBC with Platelets & Differential; Future  - Comprehensive metabolic panel; Future  - TSH with free T4 reflex; Future  - Lipid panel reflex to direct LDL Fasting; Future  - Hemoglobin A1c; Future  - PRIMARY CARE FOLLOW-UP SCHEDULING; Future  - CBC with Platelets & Differential  - Comprehensive metabolic panel  - TSH with free T4 reflex  - Lipid panel reflex to direct LDL  Fasting  - Hemoglobin A1c    Achilles tendon mass  Left achilles tendon mass, likely scarring from a prior injury is mostly asymptomatic.  Does stretches as needed and is not affecting walking.    Dermatofibroma of lower extremity, unspecified laterality  Left lower leg dermatofibroma unchanged.  Dad has had some skin stuff done.  No skin changes noted opted out of a Derm referral for now    Health care maintenance  - REVIEW OF HEALTH MAINTENANCE PROTOCOL ORDERS    Advanced directives, counseling/discussion    Colon cancer screening  - COLOGUARD(EXACT SCIENCES); Future    Screening, anemia, deficiency, iron  - CBC with Platelets & Differential; Future  - CBC with Platelets & Differential    Screening for diabetes mellitus  - Comprehensive metabolic panel; Future  - Hemoglobin A1c; Future  - Comprehensive metabolic panel  - Hemoglobin A1c    Screening for thyroid disorder  - TSH with free T4 reflex; Future  - TSH with free T4 reflex    Encounter for lipid screening for cardiovascular disease  - Lipid panel reflex to direct LDL Fasting; Future  - Lipid panel reflex to direct LDL Fasting    Patient has been advised of split billing requirements and indicates understanding: Yes    Counseling  Appropriate preventive services were addressed with this patient via screening, questionnaire, or discussion as appropriate for fall prevention, nutrition, physical activity, Tobacco-use cessation, social engagement, weight loss and cognition.  Checklist reviewing preventive services available has been given to the patient.  Reviewed patient's diet, addressing concerns and/or questions.   Regular exercise  See Patient Instructions          Subjective   Yaron is a 54 year old, presenting for the following:  Physical        10/25/2024     8:51 AM   Additional Questions   Roomed by Jaz MARKS   Accompanied by self          HPI    Health Care Directive  Patient does not have a Health Care Directive: Patient states has Advance Directive and  will bring in a copy to clinic.      10/24/2024   General Health   How would you rate your overall physical health? Good   Feel stress (tense, anxious, or unable to sleep) Not at all          10/24/2024   Nutrition   Three or more servings of calcium each day? Yes   Diet: Regular (no restrictions)   How many servings of fruit and vegetables per day? (!) 2-3   How many sweetened beverages each day? 0-1          10/24/2024   Exercise   Days per week of moderate/strenuous exercise 5 days   Average minutes spent exercising at this level 10 min          10/24/2024   Social Factors   Frequency of gathering with friends or relatives Once a week   Worry food won't last until get money to buy more No   Food not last or not have enough money for food? No   Do you have housing? (Housing is defined as stable permanent housing and does not include staying outside in a car, in a tent, in an abandoned building, in an overnight shelter, or couch-surfing.) Yes   Are you worried about losing your housing? No   Lack of transportation? No   Unable to get utilities (heat,electricity)? No          10/24/2024   Fall Risk   Fallen 2 or more times in the past year? No    Trouble with walking or balance? No        Patient-reported          10/24/2024   Dental   Dentist two times every year? Yes          10/24/2024   TB Screening   Were you born outside of the [place]? No      Today's PHQ-2 Score:       10/24/2024    12:59 PM   PHQ-2 ( 1999 [employer])   Q1: Little interest or pleasure in doing things 0    Q2: Feeling down, depressed or hopeless 0    PHQ-2 Score 0    Q1: Little interest or pleasure in doing things Not at all   Q2: Feeling down, depressed or hopeless Not at all   PHQ-2 Score 0       Patient-reported         10/24/2024   Substance Use   Alcohol more than 3/day or more than 7/wk No   Do you use any other substances recreationally? No      Social History     Tobacco Use    Smoking status: Never    Smokeless tobacco: Never   Vaping Use     Vaping status: Never Used   Substance Use Topics    Alcohol use: Yes     Alcohol/week: 0.0 standard drinks of alcohol     Comment: 2-3 drinks a week    Drug use: No           10/24/2024   STI Screening   New sexual partner(s) since last STI/HIV test? No      ASCVD Risk   The 10-year ASCVD risk score (Ritesh POON, et al., 2019) is: 3.3%    Values used to calculate the score:      Age: 54 years      Sex: Male      Is Non- : No      Diabetic: No      Tobacco smoker: No      Systolic Blood Pressure: 115 mmHg      Is BP treated: No      HDL Cholesterol: 49 mg/dL      Total Cholesterol: 175 mg/dL       Reviewed and updated as needed this visit by Provider   Tobacco  Allergies  Meds  Problems  Med Hx  Surg Hx  Fam Hx  Soc   Hx Sexual Activity          Past Medical History:   Diagnosis Date    Cervical radiculopathy 2014     Past Surgical History:   Procedure Laterality Date    HERNIA REPAIR, INGUINAL RT/LT       CIRCUMCISION       OB History   No obstetric history on file.     Lab work is in process  Labs reviewed in EPIC  BP Readings from Last 3 Encounters:   10/25/24 115/71   24 111/68   10/24/23 110/66    Wt Readings from Last 3 Encounters:   10/25/24 82.4 kg (181 lb 11.2 oz)   10/24/23 82 kg (180 lb 12.8 oz)   23 79.2 kg (174 lb 11.2 oz)         Patient Active Problem List   Diagnosis    Dermatofibroma of lower extremity    Achilles tendon mass     Past Surgical History:   Procedure Laterality Date    HERNIA REPAIR, INGUINAL RT/LT       CIRCUMCISION         Social History     Tobacco Use    Smoking status: Never    Smokeless tobacco: Never   Substance Use Topics    Alcohol use: Yes     Alcohol/week: 0.0 standard drinks of alcohol     Comment: 2-3 drinks a week     Family History   Problem Relation Age of Onset    C.A.D. Mother         heart attack age 63    Coronary Artery Disease Mother         SMOKER, HAD SCRLETT FEVER WHEN YOUNG, HAD A  VALVE REPLCEMENT    Hypertension Mother     Mitral Valve Replacement Mother     Cerebrovascular Disease Paternal Grandmother     Glaucoma Brother     Glaucoma Brother     Asthma Sister     Macular Degeneration No family hx of          No current outpatient medications on file.     Allergies   Allergen Reactions    Nkda [No Known Drug Allergy]      Recent Labs   Lab Test 10/24/23  1051 11/15/21  1641 11/11/20  1016 10/31/19  0807   * 88 110* 96   HDL 49 50 40 46   TRIG 49 82 55 56   ALT 13 23  --  24   CR 0.84 0.81  --  0.92   GFRESTIMATED >90 >90  --  >90   GFRESTBLACK  --   --   --  >90   POTASSIUM 4.5 3.7  --  4.1      BACKGROUND  54 yr with a residual asymptomatic left achilles tendon mass, hx of dermatofibroma, resolved cervical radiculopathy, FH of heart disease, previously under care of PCP Dr Galvan, the seen by Dr Cooper in 2019, seen last by andrea Winkler in nov 2020 for a physical, then by Dr Wegener for left achillis tendonitis for which did PT 2 sessions.    Seen once first time 11/18/21 for preventive health. Health care maintenance reviewed. Reported had advance directives, wife POA. Had no  concerns. Due for colon cancer screening. Options discussed. Cologuard ordered. Given the Flu shot & Covid booster. Noted Tdap due in 2022. To consider Shingrex: shingles vaccines (series of 2 shots 2 to 6 months apart that can cause couple days of flu like symptoms but is expensive so check with insurance and get at pharmacy where cheaper). Labs done. Noted his left achilles tendon mass, likely scarring from a prior injury. Managing with home exercises and self cares when flared up. Right deltoid area pain after golfing had improved. Exam benign. likely had overuse repetitive strain/ rotator cuff syndrome. Could refer to ortho if worsened. Was to return yearly for a preventive physical and sooner in an office visit for any new or worsening symptoms.  Labs showed normal CBC CMP lipids hemoglobin A1c  "and TSH.  Cologuard was normal.    Seen for a physical by Dr. DON 12/1/2022 given Tdap and Shingrix No. 1.  Completed Shingrix in March 2023.  Positive for COVID October 2023.  Seen [facility] for physical 10/20/2023 noted normal PSA CMP LDL of [address] [place]  negative    CURRENTLY   Here for a physical, last seen by this provider once in 2021    Health care maintenance reviewed  Has advance directives at home, wife POA    Due for colon cancer screening. Options discussed. Cologuard neg 2021, will redo cologuard, no risk factors    No  concerns, opted out of exam does self testicular checks regularly     Vaccines reviewed and up-to-date.  Had flu and COVID-vaccine 9/28/2024.    Is fasting for lab work today and will make further recommendations once reviewed.  Discussed age risk benefits [place] task force testing for PSA given usually started age 56 was normal last year no symptoms no family history of prostate cancer risk factors mutual decision made not to check today    Left achilles tendon mass, likely scarring from a prior injury is mostly asymptomatic.  Does stretches as needed and is not affecting walking.    Left lower leg dermatofibroma unchanged.  Dad has had some skin stuff done.  No skin changes noted opted out of a Derm referral for now    Review of Systems  Constitutional, HEENT, cardiovascular, pulmonary, GI, , musculoskeletal, neuro, skin, endocrine and psych systems are negative, except as otherwise noted.     Objective    Exam  /71 (BP Location: Right arm, Patient Position: Sitting, Cuff Size: Adult Regular)   Pulse 62   Temp 97.2  F (36.2  C) (Temporal)   Resp 18   Ht 1.854 m (6' 1\")   Wt 82.4 kg (181 lb 11.2 oz)   SpO2 99%   BMI 23.97 kg/m     Estimated body mass index is 23.97 kg/m  as calculated from the following:    Height as of this encounter: 1.854 m (6' 1\").    Weight as of this encounter: 82.4 kg (181 lb 11.2 oz).    Physical Exam  GENERAL: alert and no distress  EYES: " Eyes grossly normal to inspection, PERRL and conjunctivae and sclerae normal, glasses  HENT: ear canals and TM's normal, nose and mouth without ulcers or lesions  NECK: no adenopathy, no asymmetry, masses, or scars  RESP: lungs clear to auscultation - no rales, rhonchi or wheezes  CV: regular rate and rhythm, normal S1 S2, no S3 or S4, no murmur, click or rub, no peripheral edema  ABDOMEN: soft, nontender, no hepatosplenomegaly, no masses and bowel sounds normal  MS: no gross musculoskeletal defects noted, no edema  SKIN: no suspicious lesions or rashes, dermatofibroma left lower leg, left Achilles tendon mass  NEURO: Normal strength and tone, mentation intact and speech normal  PSYCH: mentation appears normal, affect normal/bright    Signed Electronically by: Chantel Servin MD

## 2024-10-25 NOTE — PATIENT INSTRUCTIONS
Patient Education   Preventive Care Advice   This is general advice given by our system to help you stay healthy. However, your care team may have specific advice just for you. Please talk to your care team about your preventive care needs.  Nutrition  Eat 5 or more servings of fruits and vegetables each day.  Try wheat bread, brown rice and whole grain pasta (instead of white bread, rice, and pasta).  Get enough calcium and vitamin D. Check the label on foods and aim for 100% of the RDA (recommended daily allowance).  Lifestyle  Exercise at least 150 minutes each week  (30 minutes a day, 5 days a week).  Do muscle strengthening activities 2 days a week. These help control your weight and prevent disease.  No smoking.  Wear sunscreen to prevent skin cancer.  Have a dental exam and cleaning every 6 months.  Yearly exams  See your health care team every year to talk about:  Any changes in your health.  Any medicines your care team has prescribed.  Preventive care, family planning, and ways to prevent chronic diseases.  Shots (vaccines)   HPV shots (up to age 26), if you've never had them before.  Hepatitis B shots (up to age 59), if you've never had them before.  COVID-19 shot: Get this shot when it's due.  Flu shot: Get a flu shot every year.  Tetanus shot: Get a tetanus shot every 10 years.  Pneumococcal, hepatitis A, and RSV shots: Ask your care team if you need these based on your risk.  Shingles shot (for age 50 and up)  General health tests  Diabetes screening:  Starting at age 35, Get screened for diabetes at least every 3 years.  If you are younger than age 35, ask your care team if you should be screened for diabetes.  Cholesterol test: At age 39, start having a cholesterol test every 5 years, or more often if advised.  Bone density scan (DEXA): At age 50, ask your care team if you should have this scan for osteoporosis (brittle bones).  Hepatitis C: Get tested at least once in your life.  STIs (sexually  transmitted infections)  Before age 24: Ask your care team if you should be screened for STIs.  After age 24: Get screened for STIs if you're at risk. You are at risk for STIs (including HIV) if:  You are sexually active with more than one person.  You don't use condoms every time.  You or a partner was diagnosed with a sexually transmitted infection.  If you are at risk for HIV, ask about PrEP medicine to prevent HIV.  Get tested for HIV at least once in your life, whether you are at risk for HIV or not.  Cancer screening tests  Cervical cancer screening: If you have a cervix, begin getting regular cervical cancer screening tests starting at age 21.  Breast cancer scan (mammogram): If you've ever had breasts, begin having regular mammograms starting at age 40. This is a scan to check for breast cancer.  Colon cancer screening: It is important to start screening for colon cancer at age 45.  Have a colonoscopy test every 10 years (or more often if you're at risk) Or, ask your provider about stool tests like a FIT test every year or Cologuard test every 3 years.  To learn more about your testing options, visit:   .  For help making a decision, visit:   https://bit.ly/qm70819.  Prostate cancer screening test: If you have a prostate, ask your care team if a prostate cancer screening test (PSA) at age 55 is right for you.  Lung cancer screening: If you are a current or former smoker ages 50 to 80, ask your care team if ongoing lung cancer screenings are right for you.  For informational purposes only. Not to replace the advice of your health care provider. Copyright   2023 [employer]. All rights reserved. Clinically reviewed by the [facility]. [employer] 689248 - REV 01/24.

## 2024-10-25 NOTE — RESULT ENCOUNTER NOTE
Hello -    Here are my comments about your recent results:  -Normal red blood cell (hgb) levels, normal white blood cell count and normal platelet levels.  -A1C (diabetic test) is normal and indicates that your blood sugar has been in a normal range the last 3 months.  For additional lab test information, labtestsonline.org is an excellent reference..    Please let us know if you have any questions or concerns.     Regards,  Chantel Servin MD

## 2024-10-25 NOTE — RESULT ENCOUNTER NOTE
Hello -    Here are my comments about your recent results:  The 10-year ASCVD risk score (Ritesh POON, et al., 2019) is: 3%    Values used to calculate the score:      Age: 54 years      Sex: Male      Is Non- : No      Diabetic: No      Tobacco smoker: No      Systolic Blood Pressure: 115 mmHg      Is BP treated: No      HDL Cholesterol: 59 mg/dL      Total Cholesterol: 185 mg/dL  LDL minimally above desirable range but similar to prior and overall cardiovascular risk is low, continue with eating healthy and staying active and recheck in 1 year  -Liver and gallbladder tests are normal (ALT,AST, Alk phos, bilirubin), kidney function is normal (Cr, GFR), sodium is normal, potassium is normal, calcium is normal, glucose is normal.  -TSH (thyroid stimulating hormone) level is normal which indicates normal thyroid function.  For additional lab test information, labtestsonline.org is an excellent reference..    Please let us know if you have any questions or concerns.     Regards,  Chantel Servin MD

## 2024-12-07 LAB — NONINV COLON CA DNA+OCC BLD SCRN STL QL: NEGATIVE

## 2024-12-11 ENCOUNTER — OFFICE VISIT (OUTPATIENT)
Dept: FAMILY MEDICINE | Facility: CLINIC | Age: 54
End: 2024-12-11
Payer: COMMERCIAL

## 2024-12-11 VITALS
DIASTOLIC BLOOD PRESSURE: 76 MMHG | RESPIRATION RATE: 14 BRPM | OXYGEN SATURATION: 97 % | HEIGHT: 73 IN | BODY MASS INDEX: 24.01 KG/M2 | WEIGHT: 181.2 LBS | TEMPERATURE: 97.3 F | SYSTOLIC BLOOD PRESSURE: 110 MMHG | HEART RATE: 70 BPM

## 2024-12-11 DIAGNOSIS — M70.61 TROCHANTERIC BURSITIS, RIGHT HIP: Primary | ICD-10-CM

## 2024-12-11 PROCEDURE — 99213 OFFICE O/P EST LOW 20 MIN: CPT | Performed by: STUDENT IN AN ORGANIZED HEALTH CARE EDUCATION/TRAINING PROGRAM

## 2024-12-11 PROCEDURE — G2211 COMPLEX E/M VISIT ADD ON: HCPCS | Performed by: STUDENT IN AN ORGANIZED HEALTH CARE EDUCATION/TRAINING PROGRAM

## 2024-12-11 RX ORDER — MELOXICAM 15 MG/1
15 TABLET ORAL DAILY
Qty: 14 TABLET | Refills: 0 | Status: SHIPPED | OUTPATIENT
Start: 2024-12-11 | End: 2024-12-25

## 2024-12-11 ASSESSMENT — PAIN SCALES - GENERAL: PAINLEVEL_OUTOF10: MILD PAIN (2)

## 2024-12-11 ASSESSMENT — ENCOUNTER SYMPTOMS: LEG PAIN: 1

## 2024-12-11 NOTE — PATIENT INSTRUCTIONS
Meloxicam with food daily for 14 days (don't combine with advil, aleve, or other NSAIDs).  Tylenol ok as needed.   Topical Voltaren gel 3-4 times daily over affected area.   Ice/heat as needed.   Stretching as tolerated.

## 2024-12-11 NOTE — PROGRESS NOTES
Assessment & Plan     Trochanteric bursitis/gluteal tendinopathy, right hip  Exam supports diagnosis of trochanteric bursitis/gluteal tendinopathy. Discussed conservative measures with patient (see below). If not getting better over next two weeks, can consider hip x-ray and if normal/negative, could return for trochanteric bursa steroid injection here at clinic with me.     Meloxicam with food daily for 14 days (don't combine with advil, aleve, or other NSAIDs).  Tylenol ok as needed.   Topical Voltaren gel 3-4 times daily over affected area.   Ice/heat as needed.   Stretching as tolerated.     - meloxicam (MOBIC) 15 MG tablet; Take 1 tablet (15 mg) by mouth daily for 14 days.    The longitudinal plan of care for the diagnosis(es)/condition(s) as documented were addressed during this visit. Due to the added complexity in care, I will continue to support Yaron in the subsequent management and with ongoing continuity of care.    Subjective   Yaron is a 54 year old, presenting for the following health issues:  Leg Pain (Upper right thigh, ongoing for a couple months )      12/11/2024    10:50 AM   Additional Questions   Roomed by Cindy MARKS     Leg Pain    History of Present Illness       Reason for visit:  Pain in upper right thigh  Symptom onset:  3-4 weeks ago  Symptoms include:  Pain in upper right thigh  Symptom intensity:  Moderate  Symptom progression:  Staying the same  Had these symptoms before:  No  What makes it worse:  Sitting for long periods  What makes it better:  Walking   He is taking medications regularly.       Leg Pain    History of Present Illness       Reason for visit:  Pain in the lateral right thigh  Symptom onset:  3-4 weeks ago  Symptoms include:  Pain in upper lateral thigh. No injury/trauma. Took two long road trips over last 6 weeks - maybe this caused/exacerbated pain. No associated weakness, redness, clicking/popping of hip join, numbness/tingling, or fever.   Symptom intensity:   "Moderate  Symptom progression:  Staying the same, not resolving.   Had these symptoms before:  No  What makes it worse:  Sitting for long periods  What makes it better:  Walking   Treatments: None tried.   He is taking medications regularly.                  Objective    /76 (BP Location: Right arm, Patient Position: Sitting, Cuff Size: Adult Regular)   Pulse 70   Temp 97.3  F (36.3  C) (Temporal)   Resp 14   Ht 1.854 m (6' 1\")   Wt 82.2 kg (181 lb 3.2 oz)   SpO2 97%   BMI 23.91 kg/m    Body mass index is 23.91 kg/m .  Physical Exam   GENERAL: healthy, alert and no distress  EYES: Eyes grossly normal to inspection, EOM intact and conjunctivae normal  RESP: breathing comfortably on room air  Right  Hip  Inspection: No erythema, ecchymosis, edema or bony/muscle deformities.   Palpation: Mild pain with palpation of great trochanteric bursa, otherwise hip and low back non-tender.  ROM: Full range of motion of hip.   Ramin Test: negative  Fadir: negative  Extremity: Neurovascularuly intact. Motor and sensory function of lower extremities are intact with no apparent deficits. DP pulse 2+ bilaterally.  No cyanosis, clubbing or edema. No abnormal limb length.  PSYCH: mentation appears normal, affect normal/bright              Signed Electronically by: Arnol Patiño PA-C    "

## 2025-01-11 ENCOUNTER — ANCILLARY PROCEDURE (OUTPATIENT)
Dept: GENERAL RADIOLOGY | Facility: CLINIC | Age: 55
End: 2025-01-11
Attending: STUDENT IN AN ORGANIZED HEALTH CARE EDUCATION/TRAINING PROGRAM
Payer: COMMERCIAL

## 2025-01-11 DIAGNOSIS — M70.61 TROCHANTERIC BURSITIS, RIGHT HIP: ICD-10-CM

## 2025-01-11 PROCEDURE — 73502 X-RAY EXAM HIP UNI 2-3 VIEWS: CPT | Mod: TC | Performed by: INTERNAL MEDICINE

## 2025-01-13 NOTE — RESULT ENCOUNTER NOTE
Hello     Thanks for coming to clinic.  The clinician who ordered these tests is currently out of the office, but we wanted to let you know that your results have been reviewed and there are no urgent or worrisome findings.      Your clinician will review your results upon their return and may get back to you with further information if needed.      Dr.Louisa Gareth VIZCARRA / L[facility]

## 2025-01-14 DIAGNOSIS — H43.811 POSTERIOR VITREOUS DETACHMENT OF RIGHT EYE: Primary | ICD-10-CM

## 2025-04-28 NOTE — PROGRESS NOTES
CC -   HST OD    INTERVAL HISTORY - No new F/F    PMH -   Sascha Joshi is a  54 year old year-old patient with history of HST OD Dx 4/8/24, symptoms since 3/30/24, no trauma      PAST OCULAR SURGERY  Pexy OD 4/8/24 (DD[facility])      RETINAL IMAGING:  OCT 05/07/24   OD - macula retina normal, PVD +vit opacities  OS - macula retina normal, PHF attached      ASSESSMENT & PLAN    # HST OD   - Dx 4/8/24, symptoms 3/30/24   - s/p pexy 4/8/24   - well contained     - recheck 2 months        # PVD OD   - symptoms 3/30/24   - + mild VH from HST      # syneresis OS   - advised S/Sx RD 4/8/24      # tr NS OU        Return in about 2 months (around 7/7/2024) for DFE OD, OCT OU.       ATTESTATION     Attending Attestation:     Complete documentation of historical and exam elements from today's encounter can be found in the full encounter summary report (not reduplicated in this progress note).  I personally obtained the chief complaint(s) and history of present illness.  I confirmed and edited as necessary the review of systems, past medical/surgical history, family history, social history, and examination findings as documented by others; and I examined the patient myself.  I personally reviewed the relevant tests, images, and reports as documented above.  I formulated and edited as necessary the assessment and plan and discussed the findings and management plan with the patient and family    Gi Paul MD, PhD  , Vitreoretinal Surgery  Department of Ophthalmology  [facility]       No

## 2025-05-28 ENCOUNTER — OFFICE VISIT (OUTPATIENT)
Dept: FAMILY MEDICINE | Facility: CLINIC | Age: 55
End: 2025-05-28
Payer: COMMERCIAL

## 2025-05-28 ENCOUNTER — ANCILLARY PROCEDURE (OUTPATIENT)
Dept: GENERAL RADIOLOGY | Facility: CLINIC | Age: 55
End: 2025-05-28
Attending: FAMILY MEDICINE
Payer: COMMERCIAL

## 2025-05-28 VITALS
OXYGEN SATURATION: 99 % | HEART RATE: 75 BPM | WEIGHT: 186 LBS | RESPIRATION RATE: 16 BRPM | HEIGHT: 74 IN | BODY MASS INDEX: 23.87 KG/M2 | SYSTOLIC BLOOD PRESSURE: 102 MMHG | DIASTOLIC BLOOD PRESSURE: 64 MMHG | TEMPERATURE: 97.9 F

## 2025-05-28 DIAGNOSIS — R22.41 NODULE OF SKIN OF RIGHT FOOT: ICD-10-CM

## 2025-05-28 DIAGNOSIS — R22.41 NODULE OF SKIN OF RIGHT FOOT: Primary | ICD-10-CM

## 2025-05-28 PROCEDURE — 3074F SYST BP LT 130 MM HG: CPT | Performed by: FAMILY MEDICINE

## 2025-05-28 PROCEDURE — 99214 OFFICE O/P EST MOD 30 MIN: CPT | Performed by: FAMILY MEDICINE

## 2025-05-28 PROCEDURE — 3078F DIAST BP <80 MM HG: CPT | Performed by: FAMILY MEDICINE

## 2025-05-28 PROCEDURE — 73630 X-RAY EXAM OF FOOT: CPT | Mod: TC | Performed by: STUDENT IN AN ORGANIZED HEALTH CARE EDUCATION/TRAINING PROGRAM

## 2025-05-28 NOTE — PROGRESS NOTES
"  Assessment & Plan     Nodule of skin of right foot  Tender nodule rather faint.  ? Dermatofibroma.  Recommend podiatry evaluation and referred for that.  - XR Foot Right G/E 3 Views  - Orthopedic  Referral      Subjective   Yaron is a 55 year old, presenting for the following health issues:  Foot Pain (Arch of right pain since friday)      5/28/2025     9:09 AM   Additional Questions   Roomed by Gosia MORRISON   Accompanied by self     History of Present Illness       Reason for visit:  Pain in arch of right foot  Symptom onset:  3-7 days ago  Symptoms include:  Pain. Can feel something under the skin.  Symptom intensity:  Mild  Symptom progression:  Staying the same  Had these symptoms before:  No  What makes it worse:  Pressure on the arch  What makes it better:  No pressure on the arch   He is taking medications regularly.      Felt nodule with pain in right plantar jx between mid and hind foot.  No injury.  Hurts with pressure.  Never had this before.  Has dermatofibroma lower left leg, hx of mass in achilles.   No history to suggest foreign body.      Objective    /64 (BP Location: Left arm, Patient Position: Sitting, Cuff Size: Adult Regular)   Pulse 75   Temp 97.9  F (36.6  C) (Temporal)   Resp 16   Ht 1.87 m (6' 1.62\")   Wt 84.4 kg (186 lb)   SpO2 99%   BMI 24.13 kg/m    Body mass index is 24.13 kg/m .  Physical Exam   GEN: alert, not distressed.  FOOT EXAM:  DP and PT pulses are normal.  Nails normal.  Hair growth present.  Faint nodularity palpable in area described above--skin vs. subcutaneous.  Mild to moderate tenderness.  No other skin change--redness, punctate vessels,etc    XR ray foot personally reviewed.  No FB noted.  No bone abnormality.  Nothing in the area of concern.        Signed Electronically by: Chun Bliss MD    "

## 2025-05-28 NOTE — PROGRESS NOTES
Prior to immunization administration, verified patients identity using patient s name and date of birth. Please see Immunization Activity for additional information.     Screening Questionnaire for Adult Immunization    Are you sick today?   No   Do you have allergies to medications, food, a vaccine component or latex?   No   Have you ever had a serious reaction after receiving a vaccination?   No   Do you have a long-term health problem with heart, lung, kidney, or metabolic disease (e.g., diabetes), asthma, a blood disorder, no spleen, complement component deficiency, a cochlear implant, or a spinal fluid leak?  Are you on long-term aspirin therapy?   No   Do you have cancer, leukemia, HIV/AIDS, or any other immune system problem?   No   Do you have a parent, brother, or sister with an immune system problem?   No   In the past 3 months, have you taken medications that affect  your immune system, such as prednisone, other steroids, or anticancer drugs; drugs for the treatment of rheumatoid arthritis, Crohn s disease, or psoriasis; or have you had radiation treatments?   No   Have you had a seizure, or a brain or other nervous system problem?   No   During the past year, have you received a transfusion of blood or blood    products, or been given immune (gamma) globulin or antiviral drug?   No   For women: Are you pregnant or is there a chance you could become       pregnant during the next month?   No   Have you received any vaccinations in the past 4 weeks?   No     Immunization questionnaire answers were all negative.      Patient instructed to remain in clinic for 15 minutes afterwards, and to report any adverse reactions.     Screening performed by Gosia Soto MA on 5/28/2025 at 9:09 AM.

## 2025-05-29 ENCOUNTER — PATIENT OUTREACH (OUTPATIENT)
Dept: CARE COORDINATION | Facility: CLINIC | Age: 55
End: 2025-05-29
Payer: COMMERCIAL

## 2025-05-30 ENCOUNTER — RESULTS FOLLOW-UP (OUTPATIENT)
Dept: FAMILY MEDICINE | Facility: CLINIC | Age: 55
End: 2025-05-30